# Patient Record
Sex: MALE | Race: WHITE | NOT HISPANIC OR LATINO | Employment: OTHER | URBAN - METROPOLITAN AREA
[De-identification: names, ages, dates, MRNs, and addresses within clinical notes are randomized per-mention and may not be internally consistent; named-entity substitution may affect disease eponyms.]

---

## 2017-01-01 ENCOUNTER — TELEPHONE (OUTPATIENT)
Dept: NEUROLOGY | Facility: CLINIC | Age: 82
End: 2017-01-01

## 2017-01-01 ENCOUNTER — TELEPHONE (OUTPATIENT)
Dept: GASTROENTEROLOGY | Facility: CLINIC | Age: 82
End: 2017-01-01

## 2017-01-01 ENCOUNTER — LAB REQUISITION (OUTPATIENT)
Dept: LAB | Facility: HOSPITAL | Age: 82
End: 2017-01-01

## 2017-01-01 ENCOUNTER — APPOINTMENT (OUTPATIENT)
Dept: GENERAL RADIOLOGY | Facility: HOSPITAL | Age: 82
End: 2017-01-01

## 2017-01-01 ENCOUNTER — APPOINTMENT (OUTPATIENT)
Dept: LAB | Facility: HOSPITAL | Age: 82
End: 2017-01-01

## 2017-01-01 ENCOUNTER — OFFICE VISIT (OUTPATIENT)
Dept: NEUROLOGY | Facility: CLINIC | Age: 82
End: 2017-01-01

## 2017-01-01 ENCOUNTER — HOSPITAL ENCOUNTER (OUTPATIENT)
Dept: CT IMAGING | Facility: HOSPITAL | Age: 82
Discharge: HOME | End: 2017-11-02
Attending: INTERNAL MEDICINE | Admitting: INTERNAL MEDICINE

## 2017-01-01 ENCOUNTER — OFFICE VISIT (OUTPATIENT)
Dept: GASTROENTEROLOGY | Facility: CLINIC | Age: 82
End: 2017-01-01

## 2017-01-01 ENCOUNTER — OUTSIDE FACILITY SERVICE (OUTPATIENT)
Dept: GASTROENTEROLOGY | Facility: CLINIC | Age: 82
End: 2017-01-01

## 2017-01-01 ENCOUNTER — HOSPITAL ENCOUNTER (EMERGENCY)
Facility: HOSPITAL | Age: 82
Discharge: HOME | End: 2017-10-16
Attending: EMERGENCY MEDICINE | Admitting: EMERGENCY MEDICINE

## 2017-01-01 ENCOUNTER — OFFICE VISIT (OUTPATIENT)
Dept: CARDIOLOGY | Facility: CLINIC | Age: 82
End: 2017-01-01

## 2017-01-01 VITALS
SYSTOLIC BLOOD PRESSURE: 122 MMHG | HEART RATE: 56 BPM | HEIGHT: 64 IN | WEIGHT: 149 LBS | DIASTOLIC BLOOD PRESSURE: 66 MMHG | BODY MASS INDEX: 25.44 KG/M2

## 2017-01-01 VITALS
HEIGHT: 64 IN | HEART RATE: 100 BPM | DIASTOLIC BLOOD PRESSURE: 84 MMHG | TEMPERATURE: 97.3 F | BODY MASS INDEX: 25.44 KG/M2 | RESPIRATION RATE: 16 BRPM | WEIGHT: 149 LBS | OXYGEN SATURATION: 95 % | SYSTOLIC BLOOD PRESSURE: 161 MMHG

## 2017-01-01 VITALS
WEIGHT: 150 LBS | BODY MASS INDEX: 25.61 KG/M2 | DIASTOLIC BLOOD PRESSURE: 75 MMHG | SYSTOLIC BLOOD PRESSURE: 112 MMHG | HEIGHT: 64 IN

## 2017-01-01 VITALS
HEIGHT: 65 IN | WEIGHT: 151 LBS | SYSTOLIC BLOOD PRESSURE: 126 MMHG | BODY MASS INDEX: 25.16 KG/M2 | DIASTOLIC BLOOD PRESSURE: 82 MMHG | RESPIRATION RATE: 20 BRPM | TEMPERATURE: 97.7 F | OXYGEN SATURATION: 98 % | HEART RATE: 52 BPM

## 2017-01-01 VITALS
DIASTOLIC BLOOD PRESSURE: 72 MMHG | HEIGHT: 64 IN | TEMPERATURE: 97.2 F | RESPIRATION RATE: 18 BRPM | WEIGHT: 148 LBS | BODY MASS INDEX: 25.27 KG/M2 | OXYGEN SATURATION: 99 % | SYSTOLIC BLOOD PRESSURE: 140 MMHG | HEART RATE: 77 BPM

## 2017-01-01 DIAGNOSIS — K29.51 CHRONIC GASTRITIS WITH BLEEDING: ICD-10-CM

## 2017-01-01 DIAGNOSIS — K29.61 OTHER GASTRITIS WITH BLEEDING: ICD-10-CM

## 2017-01-01 DIAGNOSIS — I10 ESSENTIAL HYPERTENSION: Primary | ICD-10-CM

## 2017-01-01 DIAGNOSIS — F02.80 LEWY BODY DEMENTIA WITHOUT BEHAVIORAL DISTURBANCE (HCC): ICD-10-CM

## 2017-01-01 DIAGNOSIS — I11.9 HYPERTENSIVE HEART DISEASE WITHOUT HEART FAILURE: ICD-10-CM

## 2017-01-01 DIAGNOSIS — F02.80 LEWY BODY DEMENTIA WITHOUT BEHAVIORAL DISTURBANCE (HCC): Primary | ICD-10-CM

## 2017-01-01 DIAGNOSIS — R63.4 WEIGHT LOSS, ABNORMAL: ICD-10-CM

## 2017-01-01 DIAGNOSIS — R15.9 INCONTINENCE OF FECES WITH FECAL URGENCY: ICD-10-CM

## 2017-01-01 DIAGNOSIS — R19.7 DIARRHEA: ICD-10-CM

## 2017-01-01 DIAGNOSIS — R19.7 DIARRHEA, UNSPECIFIED TYPE: Primary | ICD-10-CM

## 2017-01-01 DIAGNOSIS — G31.83 LEWY BODY DEMENTIA WITHOUT BEHAVIORAL DISTURBANCE (HCC): Primary | ICD-10-CM

## 2017-01-01 DIAGNOSIS — S63.501A SPRAIN OF RIGHT WRIST, INITIAL ENCOUNTER: Primary | ICD-10-CM

## 2017-01-01 DIAGNOSIS — R19.7 DIARRHEA, UNSPECIFIED TYPE: ICD-10-CM

## 2017-01-01 DIAGNOSIS — G31.83 LEWY BODY DEMENTIA WITHOUT BEHAVIORAL DISTURBANCE (HCC): ICD-10-CM

## 2017-01-01 DIAGNOSIS — K57.30 PANCOLONIC DIVERTICULOSIS: ICD-10-CM

## 2017-01-01 DIAGNOSIS — R00.2 PALPITATIONS: ICD-10-CM

## 2017-01-01 DIAGNOSIS — R55 SYNCOPE, UNSPECIFIED SYNCOPE TYPE: ICD-10-CM

## 2017-01-01 DIAGNOSIS — K52.9 NONINFECTIVE GASTROENTERITIS AND COLITIS: ICD-10-CM

## 2017-01-01 DIAGNOSIS — K52.9 CHRONIC DIARRHEA: Primary | ICD-10-CM

## 2017-01-01 DIAGNOSIS — R15.2 INCONTINENCE OF FECES WITH FECAL URGENCY: ICD-10-CM

## 2017-01-01 DIAGNOSIS — R58 MULTIPLE ECCHYMOSES OF BOTH UPPER ARMS: ICD-10-CM

## 2017-01-01 LAB
ALBUMIN SERPL-MCNC: 3.9 G/DL (ref 3.2–4.8)
ALBUMIN/GLOB SERPL: 1.3 G/DL (ref 1.5–2.5)
ALP SERPL-CCNC: 80 U/L (ref 25–100)
ALT SERPL W P-5'-P-CCNC: 29 U/L (ref 7–40)
ANION GAP SERPL CALCULATED.3IONS-SCNC: 14 MMOL/L (ref 3–11)
AST SERPL-CCNC: 24 U/L (ref 0–33)
BASOPHILS # BLD AUTO: 0.01 10*3/MM3 (ref 0–0.2)
BASOPHILS NFR BLD AUTO: 0.2 % (ref 0–1)
BILIRUB SERPL-MCNC: 0.8 MG/DL (ref 0.3–1.2)
BUN BLD-MCNC: 28 MG/DL (ref 9–23)
BUN/CREAT SERPL: 18.7 (ref 7–25)
CALCIUM SPEC-SCNC: 9.2 MG/DL (ref 8.7–10.4)
CHLORIDE SERPL-SCNC: 107 MMOL/L (ref 99–109)
CO2 SERPL-SCNC: 20 MMOL/L (ref 20–31)
COLLECT DATE SP2 STL: NORMAL
COLLECT DATE SP3 STL: NORMAL
COLLECT DATE STL: NORMAL
CREAT BLD-MCNC: 1.5 MG/DL (ref 0.6–1.3)
CRP SERPL-MCNC: 3.5 MG/DL (ref 0–1)
CYTO UR: NORMAL
DEPRECATED RDW RBC AUTO: 45.1 FL (ref 37–54)
EOSINOPHIL # BLD AUTO: 0.15 10*3/MM3 (ref 0–0.3)
EOSINOPHIL NFR BLD AUTO: 2.7 % (ref 0–3)
ERYTHROCYTE [DISTWIDTH] IN BLOOD BY AUTOMATED COUNT: 12.4 % (ref 11.3–14.5)
GFR SERPL CREATININE-BSD FRML MDRD: 45 ML/MIN/1.73
GLOBULIN UR ELPH-MCNC: 2.9 GM/DL
GLUCOSE BLD-MCNC: 92 MG/DL (ref 70–100)
HCT VFR BLD AUTO: 43.6 % (ref 38.9–50.9)
HEMOCCULT STL QL: NEGATIVE
HGB BLD-MCNC: 14.7 G/DL (ref 13.1–17.5)
IMM GRANULOCYTES # BLD: 0.01 10*3/MM3 (ref 0–0.03)
IMM GRANULOCYTES NFR BLD: 0.2 % (ref 0–0.6)
INR PPP: 1.01
LAB AP CASE REPORT: NORMAL
LAB AP CLINICAL INFORMATION: NORMAL
LIPASE SERPL-CCNC: 31 U/L (ref 6–51)
LYMPHOCYTES # BLD AUTO: 0.9 10*3/MM3 (ref 0.6–4.8)
LYMPHOCYTES NFR BLD AUTO: 16.5 % (ref 24–44)
Lab: NORMAL
MCH RBC QN AUTO: 33.1 PG (ref 27–31)
MCHC RBC AUTO-ENTMCNC: 33.7 G/DL (ref 32–36)
MCV RBC AUTO: 98.2 FL (ref 80–99)
MONOCYTES # BLD AUTO: 0.49 10*3/MM3 (ref 0–1)
MONOCYTES NFR BLD AUTO: 9 % (ref 0–12)
NEUTROPHILS # BLD AUTO: 3.91 10*3/MM3 (ref 1.5–8.3)
NEUTROPHILS NFR BLD AUTO: 71.4 % (ref 41–71)
PATH REPORT.FINAL DX SPEC: NORMAL
PATH REPORT.GROSS SPEC: NORMAL
PLATELET # BLD AUTO: 109 10*3/MM3 (ref 150–450)
PMV BLD AUTO: 12.3 FL (ref 6–12)
POTASSIUM BLD-SCNC: 4.3 MMOL/L (ref 3.5–5.5)
PROT SERPL-MCNC: 6.8 G/DL (ref 5.7–8.2)
PROTHROMBIN TIME: 11 SECONDS (ref 9.6–11.5)
RBC # BLD AUTO: 4.44 10*6/MM3 (ref 4.2–5.76)
SODIUM BLD-SCNC: 141 MMOL/L (ref 132–146)
VIT B12 BLD-MCNC: 334 PG/ML (ref 211–911)
WBC NRBC COR # BLD: 5.47 10*3/MM3 (ref 3.5–10.8)

## 2017-01-01 PROCEDURE — 36415 COLL VENOUS BLD VENIPUNCTURE: CPT | Performed by: INTERNAL MEDICINE

## 2017-01-01 PROCEDURE — 86140 C-REACTIVE PROTEIN: CPT | Performed by: INTERNAL MEDICINE

## 2017-01-01 PROCEDURE — 99214 OFFICE O/P EST MOD 30 MIN: CPT | Performed by: PSYCHIATRY & NEUROLOGY

## 2017-01-01 PROCEDURE — 80053 COMPREHEN METABOLIC PANEL: CPT | Performed by: INTERNAL MEDICINE

## 2017-01-01 PROCEDURE — 83690 ASSAY OF LIPASE: CPT | Performed by: INTERNAL MEDICINE

## 2017-01-01 PROCEDURE — 74176 CT ABD & PELVIS W/O CONTRAST: CPT

## 2017-01-01 PROCEDURE — 88312 SPECIAL STAINS GROUP 1: CPT | Performed by: INTERNAL MEDICINE

## 2017-01-01 PROCEDURE — 82607 VITAMIN B-12: CPT | Performed by: INTERNAL MEDICINE

## 2017-01-01 PROCEDURE — 43239 EGD BIOPSY SINGLE/MULTIPLE: CPT | Performed by: INTERNAL MEDICINE

## 2017-01-01 PROCEDURE — 85610 PROTHROMBIN TIME: CPT | Performed by: INTERNAL MEDICINE

## 2017-01-01 PROCEDURE — 73110 X-RAY EXAM OF WRIST: CPT

## 2017-01-01 PROCEDURE — 99214 OFFICE O/P EST MOD 30 MIN: CPT | Performed by: NURSE PRACTITIONER

## 2017-01-01 PROCEDURE — 85025 COMPLETE CBC W/AUTO DIFF WBC: CPT | Performed by: INTERNAL MEDICINE

## 2017-01-01 PROCEDURE — 99204 OFFICE O/P NEW MOD 45 MIN: CPT | Performed by: INTERNAL MEDICINE

## 2017-01-01 PROCEDURE — 99283 EMERGENCY DEPT VISIT LOW MDM: CPT

## 2017-01-01 PROCEDURE — 93291 INTERROG DEV EVAL SCRMS IP: CPT | Performed by: INTERNAL MEDICINE

## 2017-01-01 PROCEDURE — 88305 TISSUE EXAM BY PATHOLOGIST: CPT | Performed by: INTERNAL MEDICINE

## 2017-01-01 PROCEDURE — 99213 OFFICE O/P EST LOW 20 MIN: CPT | Performed by: INTERNAL MEDICINE

## 2017-01-01 PROCEDURE — 82272 OCCULT BLD FECES 1-3 TESTS: CPT | Performed by: NURSE PRACTITIONER

## 2017-01-01 PROCEDURE — 0 DIATRIZOATE MEGLUMINE & SODIUM PER 1 ML: Performed by: INTERNAL MEDICINE

## 2017-01-01 RX ORDER — MEMANTINE HYDROCHLORIDE 28 MG/1
CAPSULE, EXTENDED RELEASE ORAL
Qty: 30 CAPSULE | Refills: 1 | Status: SHIPPED | OUTPATIENT
Start: 2017-01-01 | End: 2018-01-01 | Stop reason: SDUPTHER

## 2017-01-01 RX ORDER — OCTISALATE, AVOBENZONE, HOMOSALATE, AND OCTOCRYLENE 29.4; 29.4; 49; 25.48 MG/ML; MG/ML; MG/ML; MG/ML
LOTION TOPICAL DAILY
COMMUNITY

## 2017-01-01 RX ORDER — DONEPEZIL HYDROCHLORIDE 10 MG/1
TABLET, FILM COATED ORAL
Qty: 90 TABLET | Refills: 1 | Status: ON HOLD | OUTPATIENT
Start: 2017-01-01 | End: 2018-01-01

## 2017-01-01 RX ORDER — CARVEDILOL 6.25 MG/1
6.25 TABLET ORAL 2 TIMES DAILY
Qty: 180 TABLET | Refills: 3 | Status: ON HOLD | OUTPATIENT
Start: 2017-01-01 | End: 2018-01-01

## 2017-01-01 RX ORDER — LORATADINE 10 MG/1
CAPSULE, LIQUID FILLED ORAL AS NEEDED
COMMUNITY
End: 2018-01-01 | Stop reason: HOSPADM

## 2017-01-01 RX ORDER — AMLODIPINE BESYLATE 2.5 MG/1
TABLET ORAL
Qty: 90 TABLET | Refills: 2 | Status: ON HOLD | OUTPATIENT
Start: 2017-01-01 | End: 2018-01-01

## 2017-01-01 RX ADMIN — Medication 45 ML: at 13:30

## 2017-02-01 ENCOUNTER — OFFICE VISIT (OUTPATIENT)
Dept: CARDIOLOGY | Facility: CLINIC | Age: 82
End: 2017-02-01

## 2017-02-01 VITALS
HEART RATE: 59 BPM | BODY MASS INDEX: 27.66 KG/M2 | DIASTOLIC BLOOD PRESSURE: 72 MMHG | SYSTOLIC BLOOD PRESSURE: 108 MMHG | WEIGHT: 166 LBS | HEIGHT: 65 IN

## 2017-02-01 DIAGNOSIS — I11.9 HYPERTENSIVE HEART DISEASE WITHOUT HEART FAILURE: ICD-10-CM

## 2017-02-01 DIAGNOSIS — N18.30 CHRONIC KIDNEY DISEASE, STAGE 3 (HCC): ICD-10-CM

## 2017-02-01 DIAGNOSIS — R00.2 PALPITATIONS: ICD-10-CM

## 2017-02-01 DIAGNOSIS — F02.80 LEWY BODY DEMENTIA WITHOUT BEHAVIORAL DISTURBANCE (HCC): ICD-10-CM

## 2017-02-01 DIAGNOSIS — G31.83 LEWY BODY DEMENTIA WITHOUT BEHAVIORAL DISTURBANCE (HCC): ICD-10-CM

## 2017-02-01 DIAGNOSIS — I10 ESSENTIAL HYPERTENSION: Primary | ICD-10-CM

## 2017-02-01 DIAGNOSIS — R55 SYNCOPE, UNSPECIFIED SYNCOPE TYPE: ICD-10-CM

## 2017-02-01 PROCEDURE — 99213 OFFICE O/P EST LOW 20 MIN: CPT | Performed by: INTERNAL MEDICINE

## 2017-02-01 RX ORDER — PRAVASTATIN SODIUM 40 MG
40 TABLET ORAL DAILY
Qty: 90 TABLET | Refills: 2 | Status: ON HOLD | OUTPATIENT
Start: 2017-02-01 | End: 2018-01-01

## 2017-02-01 RX ORDER — CARVEDILOL 6.25 MG/1
6.25 TABLET ORAL 2 TIMES DAILY
Qty: 180 TABLET | Refills: 2 | Status: SHIPPED | OUTPATIENT
Start: 2017-02-01 | End: 2017-01-01 | Stop reason: SDUPTHER

## 2017-02-01 RX ORDER — AMLODIPINE BESYLATE 2.5 MG/1
2.5 TABLET ORAL DAILY
Qty: 90 TABLET | Refills: 3 | Status: SHIPPED | OUTPATIENT
Start: 2017-02-01 | End: 2017-01-01 | Stop reason: SDUPTHER
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
## 2017-02-08 RX ORDER — MEMANTINE HYDROCHLORIDE 28 MG/1
CAPSULE, EXTENDED RELEASE ORAL
Qty: 90 CAPSULE | Refills: 2 | Status: SHIPPED | OUTPATIENT
Start: 2017-02-08 | End: 2017-01-01 | Stop reason: SDUPTHER

## 2017-02-08 RX ORDER — DONEPEZIL HYDROCHLORIDE 10 MG/1
TABLET, FILM COATED ORAL
Qty: 90 TABLET | Refills: 2 | Status: SHIPPED | OUTPATIENT
Start: 2017-02-08 | End: 2017-01-01 | Stop reason: SDUPTHER

## 2017-03-01 ENCOUNTER — TELEPHONE (OUTPATIENT)
Dept: NEUROLOGY | Facility: CLINIC | Age: 82
End: 2017-03-01

## 2017-03-01 NOTE — TELEPHONE ENCOUNTER
I called and spoke to Mrs Aguilar and informed her of  instructions, she verbalized understanding and she will call PCP to see rule out any other medical issue, I offered her sooner f/u on Friday but they are not available, she will call back if needed and has an appt. at the end of this month.

## 2017-03-01 NOTE — TELEPHONE ENCOUNTER
----- Message from Mat Trinidad MD sent at 3/1/2017  2:56 PM EST -----  Regarding: RE: new symptoms  Contact: [phone]  Given the acute change, I would first consider checking with PCP for infection or other acute medical issue. Otherwise, I'm happy to add him on to my schedule, possibly Friday at 3 pm. Thanks.  ----- Message -----     From: Jasmina W Antione     Sent: 3/1/2017   1:30 PM       To: Mat Trinidad MD, #  Subject: new symptoms                                     Phone call from Mrs. Aguilar who wanted to let me know that there have been 2 days where he's had acute confusion, forgetting what a bathroom was, forgetting where his clothes are and how to get dressed, didn't understand how to use the toilet. He's also become incontinent of bowels. Dr. Trinidad, do ou have any recommendations or thought or is this disease progression (which we discussed at length). They would agree to a sooner appt with Dr. Trinidad if you agreed. Sanam, can you please call them back with his recommendations and an appt (if needed). Thanks!

## 2017-03-01 NOTE — TELEPHONE ENCOUNTER
Phone call from Mrs. Aguilar who wanted to let us know that there have been 2 days where he's had acute confusion, forgetting what a bathroom was, forgetting where his clothes are and how to get dressed, didn't understand how to use the toilet. He's also become incontinent of bowels, also refusing depends. We discussed disease progression and approaches to some of the issues she's experiencing. I sent a message to Dr. Trinidad to review and provide feedback on disease progression or any necessary medical evaluation. He is still working out 3x per week though staff says he's much slower at exercise and sometimes has to provide him with reminders and redirection back to the task. Mrs. Aguilar knows she needs to be looking into respite care, which we have discussed in past. She agreed to call me to discuss any other care needs.

## 2017-03-03 ENCOUNTER — TRANSCRIBE ORDERS (OUTPATIENT)
Dept: ADMINISTRATIVE | Facility: HOSPITAL | Age: 82
End: 2017-03-03

## 2017-03-03 DIAGNOSIS — R41.0 CONFUSION: Primary | ICD-10-CM

## 2017-03-06 ENCOUNTER — TELEPHONE (OUTPATIENT)
Dept: NEUROLOGY | Facility: CLINIC | Age: 82
End: 2017-03-06

## 2017-03-06 NOTE — TELEPHONE ENCOUNTER
----- Message from Mat Trinidad MD sent at 3/3/2017  4:04 PM EST -----  Regarding: RE: new symptoms  Contact: [phone]  I think that's reasonable. Thanks.  ----- Message -----     From: Jasmina Talbot     Sent: 3/3/2017   3:57 PM       To: Mat Trinidad MD  Subject: RE: new symptoms                                 Dr. Aguilar saw Dr. Ordonez today who said this is not related to an infection but likely hypoglycemia or seizure activity and ordered EEG (March 22) and he's to monitor blood sugar at home. She's going to proceed with EEG but wanted your feedback...Thanks  ----- Message -----     From: Mat Trinidad MD     Sent: 3/1/2017   2:56 PM       To: Patricia Danielle Snellen, [facility], #  Subject: RE: new symptoms                                 Given the acute change, I would first consider checking with PCP for infection or other acute medical issue. Otherwise, I'm happy to add him on to my schedule, possibly Friday at 3 pm. Thanks.  ----- Message -----     From: Jasmina Talbot     Sent: 3/1/2017   1:30 PM       To: Mat Trinidad MD, #  Subject: new symptoms                                     Phone call from Mrs. Aguilar who wanted to let me know that there have been 2 days where he's had acute confusion, forgetting what a bathroom was, forgetting where his clothes are and how to get dressed, didn't understand how to use the toilet. He's also become incontinent of bowels. Dr. Trinidad, do ou have any recommendations or thought or is this disease progression (which we discussed at length). They would agree to a sooner appt with Dr. Trinidad if you agreed. Sanam, can you please call them back with his recommendations and an appt (if needed). Thanks!

## 2017-03-22 ENCOUNTER — HOSPITAL ENCOUNTER (OUTPATIENT)
Dept: NEUROLOGY | Facility: HOSPITAL | Age: 82
Discharge: HOME | End: 2017-03-22
Admitting: PHYSICIAN ASSISTANT

## 2017-03-22 DIAGNOSIS — R41.0 CONFUSION: ICD-10-CM

## 2017-03-22 PROCEDURE — 95816 EEG AWAKE AND DROWSY: CPT

## 2017-03-24 ENCOUNTER — OFFICE VISIT (OUTPATIENT)
Dept: NEUROLOGY | Facility: CLINIC | Age: 82
End: 2017-03-24

## 2017-03-24 VITALS
HEIGHT: 65 IN | BODY MASS INDEX: 26.66 KG/M2 | SYSTOLIC BLOOD PRESSURE: 120 MMHG | WEIGHT: 160 LBS | DIASTOLIC BLOOD PRESSURE: 80 MMHG

## 2017-03-24 DIAGNOSIS — F02.80 LEWY BODY DEMENTIA WITHOUT BEHAVIORAL DISTURBANCE (HCC): Primary | ICD-10-CM

## 2017-03-24 DIAGNOSIS — G31.83 LEWY BODY DEMENTIA WITHOUT BEHAVIORAL DISTURBANCE (HCC): Primary | ICD-10-CM

## 2017-03-24 PROCEDURE — 99214 OFFICE O/P EST MOD 30 MIN: CPT | Performed by: PSYCHIATRY & NEUROLOGY

## 2017-03-24 RX ORDER — TAMSULOSIN HYDROCHLORIDE 0.4 MG/1
1 CAPSULE ORAL NIGHTLY
Status: ON HOLD | COMMUNITY
End: 2018-01-01

## 2017-03-24 NOTE — PROGRESS NOTES
"Hayder Aguilar    Subjective     CC: memory loss    History of Present Illness   Hayder Aguilar returns to clinic today with a history of possible DLB. He has noted symptoms since at least 2014 marked initially by forgetfulness and repetitiveness. This has gradually worsened  over time. Additional symptoms have included impairments in orientation  and executive function. There has been a history of possible visual hallucinations. He has had no associated  symptoms of BPSD.  He is no longer driving . He is currently residing at home with his wife.       Prior evaluation has included screening blood work  and an MRI of the brain which was unremarkable . He is currently taking donepezil and memantine.      Since his last visit on 9/23/16, his wife has noted a few episodes of increased confusion lasting hours. During these episodes he is disoriented, at times not even recognizing his own bathroom. He has been evaluated by Dr. Ordonez, who ruled out infection. An EEG showed only non-specific slowing, without evidence for seizure tendency. Some concern for hypoglycemia has been raised, and his wife has been concerned about volume depletion.      The following portions of the patient's history were reviewed and updated as appropriate: current medications, past family history, past medical history and past social history.    Review of Systems   Constitutional: Negative.    Respiratory: Negative.    Cardiovascular: Negative.    Genitourinary: Negative.        Objective     /80  Ht 65\" (165.1 cm)  Wt 160 lb (72.6 kg)  BMI 26.63 kg/m2     Neurologic Exam     Mental Status   Oriented to person, place, and time.   Registration: recalls 3 of 3 objects. Recall at 5 minutes: recalls 2 of 3 objects.   Attention: normal. Concentration: normal.   Speech: speech is normal   Level of consciousness: alert  Knowledge: good.   Able to name object. Able to read. Able to repeat. Able to write. Normal comprehension.     Cranial Nerves "   Cranial nerves II through XII intact.     Motor Exam   Muscle bulk: normal  Right arm tone: increased  Left arm tone: increased    Strength   Strength 5/5 throughout.     Sensory Exam   Light touch normal.     Gait, Coordination, and Reflexes     Gait  Gait: shuffling (decreased arm swing)      MMSE=24      Assessment/Plan   Hayder was seen today for memory loss.    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 I again reviewed his current status and treatment options. I suspect that his recent episodes of confusion were a manifestation of his underlying DLB. His EEG is relatively reassuring. After discussing potential treatment options, it was elected to continue on  donepezil and memantine unchanged. However, I did discuss the possibility of a trial of Sinemet for his motoric symptoms, which was declined for now. He will then follow up in 6 months , or sooner if needed.       I spent 25 minutes with the patient and family. I spent 65 percent of this time counseling and discussing evaluation, current status, treatment options and management.    As part of this visit I obtained additional history from the family which is incorporated in the HPI.    Mat Trinidad MD
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
## 2017-04-27 NOTE — TELEPHONE ENCOUNTER
----- Message from Mat Trinidad MD sent at 4/26/2017  4:15 PM EDT -----  Regarding: FW: Vitamin E  Contact: [phone]  The vitamin E is not necessary in my opinion. However, if she would like to try, she might try over the counter vitamin E at just 400 IU daily. Thanks.  ----- Message -----     From: FREDY Herrera     Sent: 4/26/2017   3:51 PM       To: Mat Trinidad MD, #  Subject: Vitamin E                                        Dr. Ordonez recommended Vit. E 4000 units for dementia and this is over $50 at pharmacy. Wife would like for you to say if this is needed or not. Please call her at [phone]

## 2017-04-27 NOTE — TELEPHONE ENCOUNTER
Called and spoke to pts wife huey and informed her of  instructions she verbalized understanding and will cb if she has any additional questions.
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
## 2017-06-23 NOTE — TELEPHONE ENCOUNTER
"Per Dr. Trinidad, I told Daisy to take him to ER to get evaluated and she verbalized understanding.     ----- Message from Patricia Danielle Snellen, CMA sent at 6/23/2017  3:47 PM EDT -----  Contact: Marlen Aguilar (Spouse)      ----- Message -----     From: Mat Trinidad MD     Sent: 6/23/2017   3:42 PM       To: Patricia Danielle Snellen, CMA    If he is hallucinating, please offer a trial of Seroquel 25 mg qhs for now (this could be then increased to 25 mg bid if needed). If he is a danger to himself or others, then EMS should be called and he should be taken to a behavioral health facility such as the [facility]. Thanks.  ----- Message -----     From: Patricia Danielle Snellen, CMA     Sent: 6/23/2017   3:35 PM       To: Mat Trinidad MD        ----- Message -----     From: Elsa Forbes     Sent: 6/23/2017   2:05 PM       To: [facility] [place]    FORREST GEE CALLED IN REGARDS TO HER , KENNEDY. SHE SAID HE \"LOST IT\" TODAY. HE IS HALLUCINATING AND ACTING OUT.  SHE REQUESTED TO SPEAK TO EVELIA    PLEASE CALL MARLEN BACK TODAY  [phone]        "

## 2017-07-13 NOTE — PATIENT INSTRUCTIONS
"Start Over The Counter (OTC) probiotic (Culturelle, Alfredito's Colon, Health, Align or any probiotics that contain \"Lactobacillus\" or \"Bifidobacterium\") once daily.    Benefiber 2 tablespoon a day     Diverticulosis  Diverticulosis is the condition that develops when small pouches (diverticula) form in the wall of your colon. Your colon, or large intestine, is where water is absorbed and stool is formed. The pouches form when the inside layer of your colon pushes through weak spots in the outer layers of your colon.  CAUSES   No one knows exactly what causes diverticulosis.  RISK FACTORS  · Being older than 50. Your risk for this condition increases with age. Diverticulosis is rare in people younger than 40 years. By age 80, almost everyone has it.  · Eating a low-fiber diet.  · Being frequently constipated.  · Being overweight.  · Not getting enough exercise.  · Smoking.  · Taking over-the-counter pain medicines, like aspirin and ibuprofen.  SYMPTOMS   Most people with diverticulosis do not have symptoms.  DIAGNOSIS   Because diverticulosis often has no symptoms, health care providers often discover the condition during an exam for other colon problems. In many cases, a health care provider will diagnose diverticulosis while using a flexible scope to examine the colon (colonoscopy).  TREATMENT   If you have never developed an infection related to diverticulosis, you may not need treatment. If you have had an infection before, treatment may include:  · Eating more fruits, vegetables, and grains.  · Taking a fiber supplement.  · Taking a live bacteria supplement (probiotic).  · Taking medicine to relax your colon.  HOME CARE INSTRUCTIONS   · Drink at least 6-8 glasses of water each day to prevent constipation.  · Try not to strain when you have a bowel movement.  · Keep all follow-up appointments.  If you have had an infection before:   · Increase the fiber in your diet as directed by your health care provider or " dietitian.  · Take a dietary fiber supplement if your health care provider approves.  · Only take medicines as directed by your health care provider.  SEEK MEDICAL CARE IF:   · You have abdominal pain.  · You have bloating.  · You have cramps.  · You have not gone to the bathroom in 3 days.  SEEK IMMEDIATE MEDICAL CARE IF:   · Your pain gets worse.  · Your bloating becomes very bad.  · You have a fever or chills, and your symptoms suddenly get worse.  · You begin vomiting.  · You have bowel movements that are bloody or black.  MAKE SURE YOU:  · Understand these instructions.  · Will watch your condition.  · Will get help right away if you are not doing well or get worse.     This information is not intended to replace advice given to you by your health care provider. Make sure you discuss any questions you have with your health care provider.     Document Released: 09/14/2005 Document Revised: 12/23/2014 Document Reviewed: 11/12/2014  [employer] Interactive Patient Education ©2017 [employer]

## 2017-07-13 NOTE — PROGRESS NOTES
"    OUTPATIENT NEW PATIENT NOTE  Patient: KENNEDY AGUILAR : 1935  Date of Service: 2017  Dear Dr.Gregory ESAU Ordonez MD   Thank you for your referral of this patient for evaluation of equal incontinence and diarrhea  CC: Fecal Incontinence and Diarrhea  Assessment/Plan                                             ASSESSMENT & PLANS     Chronic diarrhea  Pancolonic diverticulosis  -     Clostridium Difficile Toxin, PCR  -     Occult Blood X 3, Stool  · Start Over The Counter (OTC) probiotic (Culturelle, Alfredito's Colon, Health, Align or any probiotics that contain \"Lactobacillus\" or \"Bifidobacterium\") once daily.  · Benefiber 2 tablespoon a day     Follow Up: Return in about 3 months (around 10/13/2017) for Recheck w/ dr house.      DISCUSSION: The above plan was delineated in details with patient and wife and all questions and concerns were answered.  Patient is also given contact information.  Patient is to return as scheduled or sooner if new problems arise  Subjective                                                     SUBJECTIVE   History of Present Illness  Mr. Kennedy Aguilar is a 81 y.o. male, patient of Dr. Arambula, who presents to the clinic today for an evaluation of Fecal Incontinence and Diarrhea  Of note, patient has dementia.  History is partially taken from wife.  There is Incongruence in information between wife and pt.   2-3 BMs a day. Explosive diarrhea w/ pasty/loose/mushy \"gooey\"stools. No blood.  This a problem for yrs  Change: explosive and the foul smelling gas.  Lots of bowel urgency   Diarrhea is mostly daytime  Dr Ordonez checked stools for blood, but no records seen in the computer records   Was 156-158 lbs   No frequent abx  Tried OTC Imodium w/ sx improvement.  Pt was also give a prescribed med (unknown name) by Dr Ordonez, but pt only takes one tab  Last colonoscopy in, done on 1/6/10 by Dr. House, showed pandiverticulosis  Pt denies anorexia, nausea, vomiting, dysphagia, odynophagia, " heartburn, reflux, regurgitation, early satiety.  Pt has good appetite.      ROS:Review of Systems   Constitutional: Negative.    HENT: Negative.    Eyes: Negative.    Respiratory: Negative.    Cardiovascular: Negative.    Gastrointestinal: Positive for abdominal pain and diarrhea.        Fecal Incontinence    Endocrine: Negative.    Genitourinary: Negative.    Musculoskeletal: Negative.    Skin: Negative.    Allergic/Immunologic: Negative.    Neurological: Negative.    Hematological: Negative.    Psychiatric/Behavioral: Negative.      PAST MED HX: Pt  has a past medical history of Borderline abnormal carotid duplex; Chronic kidney disease, stage 3; Cognitive dysfunction; Depression (2016); Diverticulosis (2016); Dyslipidemia; Fracture; GERD (gastroesophageal reflux disease); History of chronic kidney disease; Hyperlipidemia; Hypertension; Insomnia (2016); MCI (mild cognitive impairment); Memory loss; Partial retinal detachment; Pyrosis; Shuffling gait; Syncopal episodes (2016); Umbilical hernia (2016); Visual hallucinations; and Vitamin D deficiency (2016).  PAST SURG HX: Pt  has a past surgical history that includes Tonsillectomy; Circumcision, non-; Other surgical history; Hernia repair; and Other surgical history (2014).  FAM HX: family history includes Dementia in his other; Hypertension in his other; Pulmonary embolism in his mother; Stroke in his father and mother.  SOC HX: Pt  reports that he quit smoking about 52 years ago. His smoking use included Pipe. He has never used smokeless tobacco. He reports that he drinks alcohol. He reports that he does not use illicit drugs.  Objective                                                           OBJECTIVE   Allergy: Pt has No Known Allergies.  MEDS: •  alfuzosin (UROXATRAL) 10 MG 24 hr tablet, Take 1 tablet by mouth daily., Disp: , Rfl:   •  amLODIPine (NORVASC) 2.5 MG tablet, Take 1 tablet by mouth Daily., Disp: 90  tablet, Rfl: 3  •  carvedilol (COREG) 6.25 MG tablet, Take 1 tablet by mouth 2 (Two) Times a Day. With morning and evening meal, Disp: 180 tablet, Rfl: 3  •  Cholecalciferol (VITAMIN D3) 2000 UNITS capsule, Take 2 capsules by mouth daily., Disp: , Rfl:   •  citalopram (CeleXA) 10 MG tablet, Take 1 tablet by mouth daily., Disp: , Rfl:   •  donepezil (ARICEPT) 10 MG tablet, TAKE ONE TABLET BY MOUTH DAILY, Disp: 90 tablet, Rfl: 2  •  Multiple Vitamins-Minerals (CENTRAL-ERLINDA PO), Take 1 tablet by mouth daily., Disp: , Rfl:   •  NAMENDA XR 28 MG capsule sustained-release 24 hr extended release capsule, TAKE ONE CAPSULE BY MOUTH DAILY, Disp: 90 capsule, Rfl: 2  •  pravastatin (PRAVACHOL) 40 MG tablet, Take 1 tablet by mouth Daily., Disp: 90 tablet, Rfl: 2  •  tamsulosin (FLOMAX) 0.4 MG capsule 24 hr capsule, Take 1 capsule by mouth Every Night., Disp: , Rfl:   Lab Results   Component Value Date    WBC 4.22 06/30/2014    HGB 13.8 06/30/2014    HCT 40.0 06/30/2014    MCV 95.7 06/30/2014    MCHC 34.5 06/30/2014     (L) 06/30/2014     Lab Results   Component Value Date     06/30/2014    K 4.0 06/30/2014     (H) 06/30/2014    CO2 23 06/30/2014    BUN 20 06/30/2014    CREATININE 1.7 (H) 06/30/2014    GLUCOSE 93 06/30/2014    CALCIUM 9.1 06/30/2014    ANIONGAP 5 06/30/2014     Lab Results   Component Value Date    AST 25 05/22/2014    ALT 19 05/22/2014    ALKPHOS 71 05/22/2014    BILITOT 0.8 05/22/2014    PROTEINTOT 7.4 05/22/2014    ALBUMIN 4.1 05/22/2014     Wt Readings from Last 5 Encounters:   07/13/17 150 lb (68 kg)   03/24/17 160 lb (72.6 kg)   02/01/17 166 lb (75.3 kg)   09/23/16 158 lb (71.7 kg)   06/29/16 162 lb 12.8 oz (73.8 kg)   body mass index is 24.96 kg/(m^2).,Temp: 97.7 °F (36.5 °C),BP: 126/82,Heart Rate: 52   Physical Exam  General Well developed; well nourished; no acute distress.   ENT Oral mucosa pink and moist without thrush or lesions.    Neck Neck supple; trachea midline. No thyromegaly    Resp CTA; no rhonchi, rales, or wheezes.  Respiration effort normal  CV RRR; normal S1, S2; no M/R/G. No lower extremity edema  GI Abd soft, NT, ND, normal active bowel sounds.  No HSM.  No abd hernia  Skin No rash; no lesions; no bruises.  Skin turgor normal  Musc No clubbing; no cyanosis.  Gait steady  Psych Oriented to time, place, and person.  Appropriate affect  Thank you kindly for allowing me to be part of this patient’s care.    Pt care team: Amber WATT & Dejuan [facility]--Gastroenterology  [phone]    CC: Dr.Gregory ESAU Ordonez MD  [address] [place], [place] [id number] FAX:[phone]

## 2017-07-14 NOTE — TELEPHONE ENCOUNTER
pls let pt know that stools test to check for CDiff is not performed d/t pt having formed stools.    If diarrhea recurs w/ watery/loose stools, pls let us know so we can re-order the test.

## 2017-07-14 NOTE — TELEPHONE ENCOUNTER
"----- Message from Terrie Macedo sent at 7/14/2017 11:24 AM EDT -----  Regarding: Cancellation of Order # [id number]  Order number [id number] for the procedure CLOSTRIDIUM DIFFICILE   TOXIN, PCR [[id number]] has been canceled by Terrie Macedo   [[id number]]. This procedure was ordered by you on Jul 14, 2017 for   the patient Hayder Aguilar [[id number]]. The reason for   cancellation was \"Not Indicated - Formed Stool\".  "

## 2017-07-17 NOTE — TELEPHONE ENCOUNTER
Talked to patient's wife. She stated she didn't know that the stool had to be diarrhea or watery. Wife request a new order. I will inform Amber Sosa.

## 2017-07-17 NOTE — TELEPHONE ENCOUNTER
Called patient's wife back at [phone]. Informed her that Amber put in new order for stool test. Wife voiced understanding.

## 2017-07-27 NOTE — TELEPHONE ENCOUNTER
Informed patient's wife that Dr Alston recommend Fibercon tablets (OTC). Wife voiced understanding.

## 2017-07-27 NOTE — TELEPHONE ENCOUNTER
Patient's wife called. She stated that patient has dementia and he will not eat his food or drink with the Benefiber in it. He doesn't drink enough. Is there a pill or capsule that patient can try? I will ask Dr Alston.

## 2017-07-27 NOTE — TELEPHONE ENCOUNTER
----- Message from Mat Alston MD sent at 7/27/2017 10:52 AM EDT -----  Regarding: RE: PATIENT CALL  Contact: [phone]  Nelson,  I would recommend Fibercon.  Thank you,  GMW  ----- Message -----     From: SHAKIRA Samson     Sent: 7/27/2017   9:52 AM       To: Mat Alston MD  Subject: PATIENT CALL                                     Dr Alston,  Patient has dementia. Amber put him on Benefiber. Wife is having trouble getting him to drink; he doesn't drink enough.  Is there a pill form that patient can try? Thanks

## 2017-09-06 NOTE — PROGRESS NOTES
Subjective:     Encounter Date:09/06/2017      Patient ID: Hayder Aguilar is an 81 y.o.  white male, retired radiologist, from [place], [place]..     REFERRING PHYSICIAN/INTERNIST: Mat Ordonez MD, FACP  DERMATOLOGIST:  Dr. Lal (cannot confirm)  ORTHOPEDIC SURGEON: Rachid Oneill MD  NEUROLOGIST: Mat Trinidad MD     Chief Complaint:   Chief Complaint   Patient presents with   • Hypertensive heart disease   • Hypertension   • Palpitations     Problem List:  1. Probable hypertensive cardiovascular disease:  a. Intermittent atypical chest pain/tachypalpitations syndrome with intermittent transient visual disturbances and near syncope with documented orthostatic hypotension and acceptable telemetry with acceptable quantitative SPECT gated Cardiolite GXT in the setting of acceptable cardiac markers and serial EKGs with normal LVEF (0.68) with excellent (115% predicted) exercise capacity, August 2008.  b. Remote prolonged recurrent symptoms--possible PSVT versus atrial fibrillation, resolved, 2009.   c. Abnormal acceptable IV dobutamine stress echocardiogram, June 2014, with residual class I symptoms.   d. Labile blood pressure readings with acceptable 24-hour ambulatory blood pressure monitor, November 2015.  e. Residual class I symptoms.  2. 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   3. Dyslipidemia.   4. Remote herniorrhaphy.   5. Chronic lower tract obstructive symptoms--possible BPH.   6. History of intermittent pyrosis/gastroesophageal reflux/possible hiatal hernia with GERD syndrome.   7. Diverticulosis.   8. Labile hypertension.  9. Remote right femur fracture status post surgery, autumn 2012.  10. Apparent cognitive dysfunction with memory loss and probable Lewy body dementia without behavioral disturbance - data deficit, 2014.   11. Umbilical hernia.  12. Apparent intermittent depression/insomnia.  13. Remote partial retinal detachment.  14. Vitamin D  "deficiency.  15. Additional operations:  a. Circumcision.  b. Tonsillectomy.  16. 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  17. Stage 3 chronic kidney disease.  18. Remote borderline abnormal carotid duplex with mild nonobstructive plaque in both common carotid and carotid bifurcation without focal hemodynamically significant atherosclerotic stenosis, August 2008.     No Known Allergies      Current Outpatient Prescriptions:   •  amLODIPine (NORVASC) 2.5 MG tablet, Take 1 tablet by mouth Daily., Disp: 90 tablet, Rfl: 3  •  carvedilol (COREG) 6.25 MG tablet, Take 1 tablet by mouth 2 (Two) Times a Day. With morning and evening meal, Disp: 180 tablet, Rfl: 3  •  Cholecalciferol (VITAMIN D3) 2000 UNITS capsule, Take 4,000 Units by mouth Daily., Disp: , Rfl:   •  citalopram (CeleXA) 10 MG tablet, Take 1 tablet by mouth daily., Disp: , Rfl:   •  donepezil (ARICEPT) 10 MG tablet, TAKE ONE TABLET BY MOUTH DAILY, Disp: 90 tablet, Rfl: 2  •  Loratadine (CLARITIN) 10 MG capsule, Take  by mouth As Needed., Disp: , Rfl:   •  Multiple Vitamins-Minerals (CENTRAL-ERLINDA PO), Take 1 tablet by mouth daily., Disp: , Rfl:   •  NAMENDA XR 28 MG capsule sustained-release 24 hr extended release capsule, TAKE ONE CAPSULE BY MOUTH DAILY, Disp: 90 capsule, Rfl: 2  •  pravastatin (PRAVACHOL) 40 MG tablet, Take 1 tablet by mouth Daily., Disp: 90 tablet, Rfl: 2  •  Probiotic Product (ALIGN) 4 MG capsule, Take  by mouth Daily., Disp: , Rfl:   •  tamsulosin (FLOMAX) 0.4 MG capsule 24 hr capsule, Take 1 capsule by mouth Every Night., Disp: , Rfl:     History of Present Illness Patient returns for scheduled 7-month followup.  He says that he thinks he is \"about the same as I was a year ago, maybe a little better.\"  He is concerned about a \"wire protruding from my chest,\" but he is told " "that there is no wire there.  The patient insists that he feels a wire protruding after he gets out of the shower, but he cannot feel it now.  He had his loop recorder checked today, and he and his wife are advised that it looks good.  His wife states that he walks up the street and back, and he goes to [employer] 3 times a week.  She notes that he likes to \"dance with the ladies\" at [employer], and they like to dance with him.  The patient's wife says that his blood work has been good.  She points out that he has lost weight, but \"he's eating good.\"  He is eating breakfast now, and he has never been much of a breakfast eater, since they have been .  Patient otherwise denies chest pain, shortness of breath, PND, edema, palpitations, syncope or presyncope at this time.        Review of Systems   Constitution: Positive for weight loss.   Respiratory: Positive for cough and snoring.    Skin: Positive for dry skin and skin cancer.   Gastrointestinal: Positive for diarrhea and flatus.   Genitourinary: Positive for bladder incontinence (at times).   Neurological: Positive for excessive daytime sleepiness and loss of balance.   Psychiatric/Behavioral:        Confusion, decreased concentration, some hallucinations   Allergic/Immunologic: Positive for environmental allergies.      Obtained and otherwise negative except as outlined in problem list and HPI.    Procedures Medtronic Loop Recorder (LNQ) - R wave 0.68 mV, battery voltage nominal, no events.  Nominal interrogation.       Objective:       Vitals:    09/06/17 1310 09/06/17 1313   BP: 122/71 122/66   BP Location: Left arm Left arm   Patient Position: Sitting Standing   Pulse: 62 56   Weight: 149 lb (67.6 kg)    Height: 64\" (162.6 cm)      Body mass index is 25.58 kg/(m^2).   Last weight:  166 lbs.    Physical Exam   Constitutional: He is oriented to person, place, and time. He appears well-developed and well-nourished.   Neck: No JVD present. Carotid " bruit is not present. No thyromegaly present.   Cardiovascular: Regular rhythm, S1 normal, S2 normal and normal heart sounds.  Exam reveals no gallop, no S3 and no friction rub.    No murmur heard.  Pulses:       Dorsalis pedis pulses are 2+ on the right side, and 2+ on the left side.        Posterior tibial pulses are 2+ on the right side, and 2+ on the left side.   Pulmonary/Chest: Effort normal and breath sounds normal. He has no wheezes. He has no rhonchi. He has no rales.   Implantable loop recorder site nominal   Abdominal: Soft. He exhibits no mass. There is no hepatosplenomegaly. There is no tenderness. There is no guarding.   Lymphadenopathy:     He has no cervical adenopathy.   Neurological: He is alert and oriented to person, place, and time.   Skin: Skin is warm, dry and intact. No rash noted.   Vitals reviewed.      Lab Review:   Lab Results   Component Value Date    GLUCOSE 93 06/30/2014    BUN 20 06/30/2014    CREATININE 1.7 (H) 06/30/2014    CO2 23 06/30/2014    CALCIUM 9.1 06/30/2014    ALBUMIN 4.1 05/22/2014    AST 25 05/22/2014    ALT 19 05/22/2014       Lab Results   Component Value Date    WBC 4.22 06/30/2014    HGB 13.8 06/30/2014    HCT 40.0 06/30/2014    MCV 95.7 06/30/2014     (L) 06/30/2014       Lab Results   Component Value Date    HGBA1C 5.2 06/30/2014           Assessment:   Overall continued acceptable course with no interim cardiopulmonary complaints with acceptable functional status. We will defer additional diagnostic or therapeutic intervention from a cardiac perspective at this time.  Normal implantable loop recorder interrogation today.  We have no recent labs to review.       Diagnosis Plan   1. Essential hypertension  Controlled   2. Palpitations  Nominal ILR interrogation   3. Hypertensive heart disease without heart failure  Controlled   4. Syncope, unspecified syncope type  No recurrence   5. Lewy body dementia without behavioral disturbance  Per Neurology           Plan:         1. Patient to continue current medications and close follow up with the above providers.  2. Tentative cardiology follow up in May 2018, or patient may return sooner PRN.       Transcribed by Shelly Andino for Dr. Jorge Triana at 1:21 PM on 09/06/2017    IJorge MD, [facility], personally performed the services described in this documentation as scribed by the above named individual in my presence, and it is both accurate and complete. At 1:54 PM on 09/06/2017

## 2017-10-17 NOTE — ED PROVIDER NOTES
Subjective   Patient is a 81 y.o. male presenting with wrist injury.   History provided by:  Patient  Wrist Injury   Location:  Wrist  Wrist location:  R wrist  Injury: yes    Mechanism of injury: fall    Fall:     Fall occurred:  Tripped    Impact surface:  Hard floor  Pain details:     Quality:  Aching    Radiates to:  Does not radiate    Severity:  Moderate    Onset quality:  Sudden    Timing:  Constant    Progression:  Unchanged  Prior injury to area:  No  Relieved by:  Nothing  Worsened by:  Movement  Ineffective treatments:  None tried  Associated symptoms: swelling (right wrist)    Associated symptoms: no back pain, no decreased range of motion, no fever, no numbness and no tingling        Review of Systems   Constitutional: Negative for chills and fever.   HENT: Negative for congestion, ear pain, nosebleeds, rhinorrhea and sore throat.    Eyes: Negative for pain, discharge and visual disturbance.   Respiratory: Negative for shortness of breath and wheezing.    Cardiovascular: Negative for chest pain, palpitations and leg swelling.   Gastrointestinal: Negative for abdominal pain, blood in stool, diarrhea, nausea and vomiting.   Endocrine: Negative.    Genitourinary: Negative for dysuria, hematuria and urgency.   Musculoskeletal: Positive for arthralgias (right wrist). Negative for back pain.   Skin: Negative for pallor and rash.   Allergic/Immunologic: Negative for immunocompromised state.   Neurological: Negative for dizziness, speech difficulty, weakness and headaches.   Hematological: Negative for adenopathy. Does not bruise/bleed easily.   Psychiatric/Behavioral: Negative.        Past Medical History:   Diagnosis Date   • Borderline abnormal carotid duplex    • Chronic kidney disease, stage 3    • Cognitive dysfunction    • Depression 12/29/2016   • Diverticulosis 12/29/2016   • Dyslipidemia    • Fracture    • GERD (gastroesophageal reflux disease)    • History of chronic kidney disease    •  Hyperlipidemia    • Hypertension    • Insomnia 12/29/2016   • MCI (mild cognitive impairment)    • Memory loss    • Partial retinal detachment    • Pyrosis    • Shuffling gait    • Syncopal episodes 12/29/2016   • Umbilical hernia 12/29/2016   • Visual hallucinations    • Vitamin D deficiency 12/29/2016       No Known Allergies    Past Surgical History:   Procedure Laterality Date   • CIRCUMCISION     • HERNIA REPAIR     • OTHER SURGICAL HISTORY       right femur fracture status post surgery, autumn 2012.   • OTHER SURGICAL HISTORY  06/2014    implantable loop recorder implant   • TONSILLECTOMY         Family History   Problem Relation Age of Onset   • Dementia Other    • Hypertension Other    • Pulmonary embolism Mother    • Stroke Mother    • Stroke Father        Social History     Social History   • Marital status:      Spouse name: N/A   • Number of children: N/A   • Years of education: N/A     Social History Main Topics   • Smoking status: Former Smoker     Types: Pipe     Quit date: 1965   • Smokeless tobacco: Never Used   • Alcohol use Yes      Comment: rarely   • Drug use: No   • Sexual activity: Defer     Other Topics Concern   • None     Social History Narrative           Objective   Physical Exam   Constitutional: He is oriented to person, place, and time. He appears well-developed and well-nourished. No distress.   HENT:   Head: Normocephalic and atraumatic.   Nose: Nose normal.   Mouth/Throat: Oropharynx is clear and moist.   Eyes: EOM are normal. Pupils are equal, round, and reactive to light. No scleral icterus.   Neck: Normal range of motion. Neck supple.   Cardiovascular: Normal rate, regular rhythm and normal heart sounds.    No murmur heard.  Pulmonary/Chest: Effort normal and breath sounds normal. No respiratory distress. He has no wheezes. He has no rales. He exhibits no tenderness.   Abdominal: Soft. Bowel sounds are normal. There is no tenderness.   Musculoskeletal: Normal range of  motion. He exhibits tenderness (tender right dorsoradial wrist;  mild soft tissue swelling.  No deformity). He exhibits no edema.   Neurological: He is alert and oriented to person, place, and time.   Skin: Skin is warm and dry. No rash noted. He is not diaphoretic.   Psychiatric: He has a normal mood and affect.   Nursing note and vitals reviewed.      Procedures         ED Course  ED Course    X-ray of the right wrist shows no evidence of acute fracture.  There are some degenerative changes and mild soft tissue swelling present.  The patient we discharged home with a Velcro wrist splint.  I advised him to use over-the-counter Motrin or Tylenol as directed for pain.              MDM    Final diagnoses:   Sprain of right wrist, initial encounter            SILVINO Hidalgo  10/16/17 2054

## 2017-10-18 PROBLEM — R15.2 INCONTINENCE OF FECES WITH FECAL URGENCY: Status: ACTIVE | Noted: 2017-01-01

## 2017-10-18 PROBLEM — R15.9 INCONTINENCE OF FECES WITH FECAL URGENCY: Status: ACTIVE | Noted: 2017-01-01

## 2017-10-18 PROBLEM — R63.4 WEIGHT LOSS, ABNORMAL: Status: ACTIVE | Noted: 2017-01-01

## 2017-10-18 PROBLEM — R19.7 DIARRHEA: Status: ACTIVE | Noted: 2017-01-01

## 2017-10-18 NOTE — PROGRESS NOTES
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  He has lost approximately 10-15 lbs in past year.  He has great appetite but gets explosive diarrhea often.  Denies undigested meat or vegetables in stool, no signs of fatty globules in toilet.  No abdominal pain, cramping or nausea or vomiting.  He is also dealing with dementia(Lewy body disease?).  Lots of foul smelling flatus.  He has explosive diarrhea with incontinence of liquid approximately weekly and normally has soft stool 2=3 times a day. No blood in stool seen.      Past Medical History:   Diagnosis Date   • Borderline abnormal carotid duplex    • Chronic kidney disease, stage 3    • Cognitive dysfunction    • Depression 12/29/2016   • Diverticulosis 12/29/2016   • Dyslipidemia    • Fracture    • GERD (gastroesophageal reflux disease)    • History of chronic kidney disease    • Hyperlipidemia    • Hypertension    • Insomnia 12/29/2016   • MCI (mild cognitive impairment)    • Memory loss    • Partial retinal detachment    • Pyrosis    • Shuffling gait    • Syncopal episodes 12/29/2016   • Umbilical hernia 12/29/2016   • Visual hallucinations    • Vitamin D deficiency 12/29/2016         Family History   Problem Relation Age of Onset   • Dementia Other    • Hypertension Other    • Pulmonary embolism Mother    • Stroke Mother    • Stroke Father           reports that he quit smoking about 52 years ago. His smoking use included Pipe. He has never used smokeless tobacco. He reports that he drinks alcohol. He reports that he does not use illicit drugs.        Current Outpatient Prescriptions:   •  amLODIPine (NORVASC) 2.5 MG tablet, Take 1 tablet by mouth Daily., Disp: 90 tablet, Rfl: 3  •  carvedilol (COREG) 6.25 MG tablet, Take 1 tablet by mouth 2 (Two) Times a Day.  "With morning and evening meal, Disp: 180 tablet, Rfl: 3  •  Cholecalciferol (VITAMIN D3) 2000 UNITS capsule, Take 4,000 Units by mouth Daily., Disp: , Rfl:   •  citalopram (CeleXA) 10 MG tablet, Take 1 tablet by mouth daily., Disp: , Rfl:   •  donepezil (ARICEPT) 10 MG tablet, TAKE ONE TABLET BY MOUTH DAILY, Disp: 90 tablet, Rfl: 2  •  Loratadine (CLARITIN) 10 MG capsule, Take  by mouth As Needed., Disp: , Rfl:   •  Multiple Vitamins-Minerals (CENTRAL-ERLINDA PO), Take 1 tablet by mouth daily., Disp: , Rfl:   •  NAMENDA XR 28 MG capsule sustained-release 24 hr extended release capsule, TAKE ONE CAPSULE BY MOUTH DAILY, Disp: 90 capsule, Rfl: 2  •  pravastatin (PRAVACHOL) 40 MG tablet, Take 1 tablet by mouth Daily., Disp: 90 tablet, Rfl: 2  •  Probiotic Product (ALIGN) 4 MG capsule, Take  by mouth Daily., Disp: , Rfl:   •  tamsulosin (FLOMAX) 0.4 MG capsule 24 hr capsule, Take 1 capsule by mouth Every Night., Disp: , Rfl:     Allergies:  Review of patient's allergies indicates no known allergies.    ROS:    Review of Systems   Constitution: Positive for weight loss (Stable since 09/06/2017).   HENT: Negative.    Eyes: Negative.    Cardiovascular: Negative.    Respiratory: Negative.    Endocrine: Negative.    Hematologic/Lymphatic: Negative.    Skin: Negative.    Musculoskeletal: Negative.    Gastrointestinal: Positive for bowel incontinence, diarrhea and flatus.   Genitourinary: Negative.    Neurological: Negative.    Psychiatric/Behavioral: Negative.    Allergic/Immunologic: Negative.        Objective     Blood pressure 140/72, pulse 77, temperature 97.2 °F (36.2 °C), temperature source Temporal Artery , resp. rate 18, height 64\" (162.6 cm), weight 148 lb (67.1 kg), SpO2 99 %.    Physical Exam   Constitutional: Pt is oriented to person, place, and time and well-developed, well-nourished, and in no distress.   HENT:   Mouth/Throat: Oropharynx is clear and moist.   Neck: Normal range of motion. Neck supple. "   Cardiovascular: Normal rate, regular rhythm and normal heart sounds.    Pulmonary/Chest: Effort normal and breath sounds normal. No respiratory distress. No  wheezes.   Abdominal: Soft. Bowel sounds are hyperactive. Borborygmy present.  Soft, non-tender, normal bowel sounds; no bruits, organomegaly or masses.  Skin: Skin is warm and dry.   Psychiatric: Mood, memory, affect and judgment normal.     Assessment/Plan  Dr Aguilar sounds like he may have malabsorption syndrome with weight loss, and very good appetite.  He has easy bruising, explosive diarrhea with incontinence also.  Will do some screening labs, but may need CT of abdomen and /or EGD with small bowel biopsy.     Diagnosis:  Hayder was seen today for follow-up.    Diagnoses and all orders for this visit:    Diarrhea, unspecified type  -     CBC & Differential  -     Comprehensive Metabolic Panel  -     C-reactive Protein  -     Lipase  -     Protime-INR  -     Vitamin B12    Incontinence of feces with fecal urgency  -     CBC & Differential  -     Comprehensive Metabolic Panel  -     C-reactive Protein  -     Lipase  -     Protime-INR  -     Vitamin B12    Weight loss, abnormal  -     CBC & Differential  -     Comprehensive Metabolic Panel  -     C-reactive Protein  -     Lipase  -     Protime-INR  -     Vitamin B12    Multiple ecchymoses of both upper arms  -     Protime-INR        Anticipated Surgical Procedure:    The risks, benefits, and alternatives of this procedure have been discussed with the patient or the responsible party- the patient understands and agrees to proceed.

## 2017-11-01 NOTE — PROGRESS NOTES
"Hayder Aguilar    Subjective     CC: memory loss    History of Present Illness   Hayder Aguilar returns to clinic today with a history of possible DLB. He has noted symptoms since at least 2014 marked initially by forgetfulness and repetitiveness. This has gradually worsened  over time. Additional symptoms have included impairments in orientation  and executive function. There has been a history of possible visual hallucinations. He has had no associated  symptoms of BPSD.  He is no longer driving . He is currently residing at home with his wife.       Prior evaluation has included screening blood work  and an MRI of the brain which was unremarkable . He is currently taking donepezil and memantine.      Since his last visit on 3/24/17 he feels unchanged. His wife continues to note widespread cognitive decline as well as increasing dual incontinence. He is currently being evaluated by Dr. Rubi his bowel symptoms. Worsening aggression has also been noted and Geodon recommended by Dr. Ordonez.      The following portions of the patient's history were reviewed and updated as appropriate: current medications, past family history, past medical history and past social history.    Review of Systems   Constitutional: Negative.  Negative for diaphoresis, fatigue and fever.   Respiratory: Negative.  Negative for shortness of breath.    Cardiovascular: Negative.  Negative for chest pain and palpitations.   Gastrointestinal: Negative for abdominal pain, nausea and vomiting.   Genitourinary: Negative.    Musculoskeletal: Negative for back pain and neck pain.   Neurological: Negative for dizziness, light-headedness and headaches.   Psychiatric/Behavioral: Positive for confusion.       Objective     /75  Ht 64\" (162.6 cm)  Wt 150 lb (68 kg)  BMI 25.75 kg/m2     Neurologic Exam     Mental Status   Oriented to person.   Disoriented to place.   Disoriented to time.   Registration: recalls 3 of 3 objects. Recall at 5 minutes: recalls " 2 of 3 objects.   Attention: normal. Concentration: normal.   Speech: speech is normal   Level of consciousness: alert  Knowledge: good.   Able to name object. Able to read. Able to repeat. Able to write. Normal comprehension.     Cranial Nerves   Cranial nerves II through XII intact.     Motor Exam   Muscle bulk: normal  Right arm tone: increased  Left arm tone: increased    Strength   Strength 5/5 throughout.     Sensory Exam   Light touch normal.     Gait, Coordination, and Reflexes     Gait  Gait: shuffling (decreased arm swing)      MMSE=24      Assessment/Plan   Hayder was seen today for memory loss.    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 I again reviewed his current status and treatment options. After discussing potential treatment options, it was elected to continue on  donepezil and memantine unchanged. I did discuss Geodon as recommended by Dr. Ordonez, along with atypical neuroleptics along with risks and black box warnings in general. I also discussed again the possibility of a trial of Sinemet for his motoric symptoms. He has agreed to referral to PT. He will then follow up in 6 months , or sooner if needed.       I spent 25 minutes face to face with the patient and family. I spent 15 minutes of this time counseling and discussing diagnosis, prognosis, evaluation, current status, treatment options and management.    As part of this visit I obtained additional history from the family which is incorporated in the HPI.    Mat Trinidad MD

## 2017-11-02 NOTE — TELEPHONE ENCOUNTER
----- Message from Mat Trinidad MD sent at 11/1/2017  2:57 PM EDT -----  Yes. Sorry about the confusion. Could you verify the dosage and put it in Epic too? Thanks.  ----- Message -----     From: Patricia Danielle Snellen, CMA     Sent: 11/1/2017   2:15 PM       To: Mat Trinidad MD    Do you want pt to start geodon that's prescribed by ? thanks

## 2017-11-07 NOTE — TELEPHONE ENCOUNTER
Daughter, Daisy, left me a voicemail wanting to discuss the prescription Geodon that Dr. Ordonez prescribed saying she has concerns about this medication saying she didn't agree with the symptoms that Mrs. Aguilar reported to Dr. Ordonez's and our office. I spoke with  before calling her back and he said this is a reasonable medication to be on for the symptoms that were reported and to explain we are not in a position to determine who is telling the truth on report of his symptoms. I left her a voicemail asking to call me back to discuss the above information.

## 2017-11-13 NOTE — TELEPHONE ENCOUNTER
I called Mrs. Aguilar with Dr. Trinidad's recommendations and she verbalized understanding that she will call us in a day or so if no improvement. She said she has caregiver 7 days per week up to 9 pm some nights if needed. She said she feels like she cannot sleep at night fearing he will leave the home. She said even having increased caregivers she is experiencing a great deal of stress in his care needs so I offered to discuss senior living communities which she said she will let me know if she decides this is best. We discussed ways to reduce wandering, disease education on understanding the increased confusion and she understands she can call me at any time.

## 2017-11-13 NOTE — TELEPHONE ENCOUNTER
"Mrs. Aguilar left me a voicemail saying his agitation, restlessness and anger is worsening and has worsened since starting a medicine that Dr. Ordonez started him on recently. She said he is \"staring off into space\", pacing and restless unable to redirect, and eating uncontrollably. She said she stopped this medicine yesterday and wanted to know if that was ok. I called her back and LM asking her to call me so I can get name of medicine to relay to Dr. Trinidad.   "

## 2017-11-13 NOTE — TELEPHONE ENCOUNTER
"----- Message from Mat Trinidad MD sent at 11/13/2017  2:45 PM EST -----  Regarding: RE: worsening symptoms  It is reasonable to stop Geodon, and it might have caused akathisia leading to his restless, which should resolve quickly. Next, we could offer a trial of mirtazapine 15 mg qhs or Seroquel 25 mg qhs depending on preference for a milder or stronger medication. Thanks.  ----- Message -----     From: FREDY Herrera     Sent: 11/13/2017   2:19 PM       To: Mat Trinidad MD  Subject: worsening symptoms                               Wife and daughter both called reporting that he is worse after Dr. Ordonez started him on Geodon. Per wife, he's restless and pacing home and won't settle down, irritable and angry. Daughter said he appears \"drugged\" and more confsed, but bot report they don't feel this is a good medicine for him as patient is also saying he feels \"making me crazy\". Geodon was being given bid and they stopped giving it to him last night. They want to know if ok to stop this med and any other recommendations. Thanks    "

## 2017-11-30 NOTE — TELEPHONE ENCOUNTER
Patients wife called would like Dr. Rubi to call her son who is a physician in [place]. I will send message over to Dr. Rubi.    Hayder [facility]  [phone]
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
## 2017-12-27 NOTE — TELEPHONE ENCOUNTER
Wife called wanted to let Dr. Rubi know the following:    Wife stated that she is not seeing improvement with  and states Dr. Aguilar is having 3-5 bm's per day.

## 2017-12-27 NOTE — TELEPHONE ENCOUNTER
Thais,   Patient's wife called and left message for you to call her back. She has some questions to ask you. Thanks

## 2017-12-27 NOTE — TELEPHONE ENCOUNTER
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 She understands how to get help if needed and verbalized understanding to call us if sees any changes in his symptoms. She also understands she can call PCP to rule out UTI. I left her voicemail but she called back as I was typing this note and verbalized understanding of the above information. I gave her name of senior living placement service to get her thinking about memory care communities as well.

## 2018-01-01 ENCOUNTER — OFFICE VISIT (OUTPATIENT)
Dept: NEUROLOGY | Facility: CLINIC | Age: 83
End: 2018-01-01

## 2018-01-01 ENCOUNTER — TELEPHONE (OUTPATIENT)
Dept: NEUROLOGY | Facility: CLINIC | Age: 83
End: 2018-01-01

## 2018-01-01 ENCOUNTER — APPOINTMENT (OUTPATIENT)
Dept: CT IMAGING | Facility: HOSPITAL | Age: 83
End: 2018-01-01

## 2018-01-01 ENCOUNTER — HOSPITAL ENCOUNTER (INPATIENT)
Facility: HOSPITAL | Age: 83
LOS: 4 days | Discharge: HOME HEALTH | End: 2018-02-24
Attending: EMERGENCY MEDICINE | Admitting: INTERNAL MEDICINE

## 2018-01-01 ENCOUNTER — TELEPHONE (OUTPATIENT)
Dept: GASTROENTEROLOGY | Facility: CLINIC | Age: 83
End: 2018-01-01

## 2018-01-01 ENCOUNTER — TRANSCRIBE ORDERS (OUTPATIENT)
Dept: ADMINISTRATIVE | Facility: HOSPITAL | Age: 83
End: 2018-01-01

## 2018-01-01 ENCOUNTER — HOSPITAL ENCOUNTER (EMERGENCY)
Facility: HOSPITAL | Age: 83
Discharge: HOME | End: 2018-02-13
Attending: EMERGENCY MEDICINE | Admitting: EMERGENCY MEDICINE

## 2018-01-01 ENCOUNTER — APPOINTMENT (OUTPATIENT)
Dept: GENERAL RADIOLOGY | Facility: HOSPITAL | Age: 83
End: 2018-01-01

## 2018-01-01 ENCOUNTER — HOSPITAL ENCOUNTER (OUTPATIENT)
Dept: GENERAL RADIOLOGY | Facility: HOSPITAL | Age: 83
Discharge: HOME | End: 2018-02-13
Attending: INTERNAL MEDICINE | Admitting: INTERNAL MEDICINE

## 2018-01-01 ENCOUNTER — HOSPITAL ENCOUNTER (EMERGENCY)
Facility: HOSPITAL | Age: 83
Discharge: HOME | End: 2018-01-24
Attending: EMERGENCY MEDICINE | Admitting: EMERGENCY MEDICINE

## 2018-01-01 VITALS
TEMPERATURE: 98.1 F | BODY MASS INDEX: 25.61 KG/M2 | OXYGEN SATURATION: 97 % | RESPIRATION RATE: 18 BRPM | HEIGHT: 64 IN | SYSTOLIC BLOOD PRESSURE: 157 MMHG | WEIGHT: 150 LBS | HEART RATE: 52 BPM | DIASTOLIC BLOOD PRESSURE: 88 MMHG

## 2018-01-01 VITALS
HEIGHT: 67 IN | TEMPERATURE: 98.4 F | SYSTOLIC BLOOD PRESSURE: 136 MMHG | BODY MASS INDEX: 23.54 KG/M2 | HEART RATE: 50 BPM | OXYGEN SATURATION: 97 % | RESPIRATION RATE: 18 BRPM | WEIGHT: 150 LBS | DIASTOLIC BLOOD PRESSURE: 73 MMHG

## 2018-01-01 VITALS
HEART RATE: 63 BPM | TEMPERATURE: 97.3 F | WEIGHT: 141.8 LBS | RESPIRATION RATE: 20 BRPM | OXYGEN SATURATION: 95 % | DIASTOLIC BLOOD PRESSURE: 61 MMHG | HEIGHT: 63 IN | SYSTOLIC BLOOD PRESSURE: 117 MMHG | BODY MASS INDEX: 25.12 KG/M2

## 2018-01-01 VITALS
WEIGHT: 140 LBS | HEIGHT: 64 IN | SYSTOLIC BLOOD PRESSURE: 126 MMHG | BODY MASS INDEX: 23.9 KG/M2 | DIASTOLIC BLOOD PRESSURE: 82 MMHG

## 2018-01-01 DIAGNOSIS — R15.9 INCONTINENCE OF FECES WITH FECAL URGENCY: ICD-10-CM

## 2018-01-01 DIAGNOSIS — G31.83 LEWY BODY DEMENTIA WITH BEHAVIORAL DISTURBANCE (HCC): ICD-10-CM

## 2018-01-01 DIAGNOSIS — F02.818 LEWY BODY DEMENTIA WITH BEHAVIORAL DISTURBANCE (HCC): ICD-10-CM

## 2018-01-01 DIAGNOSIS — R19.7 DIARRHEA DUE TO MALABSORPTION: Primary | ICD-10-CM

## 2018-01-01 DIAGNOSIS — R19.7 DIARRHEA DUE TO MALABSORPTION: ICD-10-CM

## 2018-01-01 DIAGNOSIS — R45.1 AGITATION: ICD-10-CM

## 2018-01-01 DIAGNOSIS — R53.1 WEAKNESS: Primary | ICD-10-CM

## 2018-01-01 DIAGNOSIS — S32.010A CLOSED COMPRESSION FRACTURE OF FIRST LUMBAR VERTEBRA, INITIAL ENCOUNTER: ICD-10-CM

## 2018-01-01 DIAGNOSIS — S51.811A LACERATION OF RIGHT FOREARM, INITIAL ENCOUNTER: ICD-10-CM

## 2018-01-01 DIAGNOSIS — R46.89 COMBATIVE BEHAVIOR: ICD-10-CM

## 2018-01-01 DIAGNOSIS — K90.9 DIARRHEA DUE TO MALABSORPTION: Primary | ICD-10-CM

## 2018-01-01 DIAGNOSIS — Z74.09 IMPAIRED FUNCTIONAL MOBILITY, BALANCE, GAIT, AND ENDURANCE: ICD-10-CM

## 2018-01-01 DIAGNOSIS — R15.2 INCONTINENCE OF FECES WITH FECAL URGENCY: ICD-10-CM

## 2018-01-01 DIAGNOSIS — R41.0 CONFUSION: Primary | ICD-10-CM

## 2018-01-01 DIAGNOSIS — G31.83 LEWY BODY DEMENTIA WITH BEHAVIORAL DISTURBANCE (HCC): Primary | ICD-10-CM

## 2018-01-01 DIAGNOSIS — F02.818 LEWY BODY DEMENTIA WITH BEHAVIORAL DISTURBANCE (HCC): Primary | ICD-10-CM

## 2018-01-01 DIAGNOSIS — F02.80 LEWY BODY DEMENTIA WITHOUT BEHAVIORAL DISTURBANCE (HCC): Primary | ICD-10-CM

## 2018-01-01 DIAGNOSIS — R05.9 COUGH: Primary | ICD-10-CM

## 2018-01-01 DIAGNOSIS — F03.90 DEMENTIA WITHOUT BEHAVIORAL DISTURBANCE, UNSPECIFIED DEMENTIA TYPE: Primary | ICD-10-CM

## 2018-01-01 DIAGNOSIS — G31.83 LEWY BODY DEMENTIA WITHOUT BEHAVIORAL DISTURBANCE (HCC): Primary | ICD-10-CM

## 2018-01-01 DIAGNOSIS — K90.9 DIARRHEA DUE TO MALABSORPTION: ICD-10-CM

## 2018-01-01 LAB
ALBUMIN SERPL-MCNC: 3.5 G/DL (ref 3.2–4.8)
ALBUMIN SERPL-MCNC: 3.6 G/DL (ref 3.2–4.8)
ALBUMIN SERPL-MCNC: 3.6 G/DL (ref 3.2–4.8)
ALBUMIN/GLOB SERPL: 1.3 G/DL (ref 1.5–2.5)
ALBUMIN/GLOB SERPL: 1.3 G/DL (ref 1.5–2.5)
ALBUMIN/GLOB SERPL: 1.5 G/DL (ref 1.5–2.5)
ALP SERPL-CCNC: 101 U/L (ref 25–100)
ALP SERPL-CCNC: 106 U/L (ref 25–100)
ALP SERPL-CCNC: 71 U/L (ref 25–100)
ALT SERPL W P-5'-P-CCNC: 23 U/L (ref 7–40)
ALT SERPL W P-5'-P-CCNC: 31 U/L (ref 7–40)
ALT SERPL W P-5'-P-CCNC: 36 U/L (ref 7–40)
ANION GAP SERPL CALCULATED.3IONS-SCNC: 3 MMOL/L (ref 3–11)
ANION GAP SERPL CALCULATED.3IONS-SCNC: 4 MMOL/L (ref 3–11)
ANION GAP SERPL CALCULATED.3IONS-SCNC: 5 MMOL/L (ref 3–11)
ANION GAP SERPL CALCULATED.3IONS-SCNC: 7 MMOL/L (ref 3–11)
AST SERPL-CCNC: 26 U/L (ref 0–33)
AST SERPL-CCNC: 30 U/L (ref 0–33)
AST SERPL-CCNC: 32 U/L (ref 0–33)
BASOPHILS # BLD AUTO: 0.01 10*3/MM3 (ref 0–0.2)
BASOPHILS NFR BLD AUTO: 0.2 % (ref 0–1)
BILIRUB SERPL-MCNC: 0.5 MG/DL (ref 0.3–1.2)
BILIRUB UR QL STRIP: NEGATIVE
BILIRUB UR QL STRIP: NEGATIVE
BUN BLD-MCNC: 26 MG/DL (ref 9–23)
BUN BLD-MCNC: 28 MG/DL (ref 9–23)
BUN BLD-MCNC: 29 MG/DL (ref 9–23)
BUN BLD-MCNC: 32 MG/DL (ref 9–23)
BUN/CREAT SERPL: 16.3 (ref 7–25)
BUN/CREAT SERPL: 16.8 (ref 7–25)
BUN/CREAT SERPL: 18.1 (ref 7–25)
BUN/CREAT SERPL: 18.7 (ref 7–25)
CALCIUM SPEC-SCNC: 8.8 MG/DL (ref 8.7–10.4)
CALCIUM SPEC-SCNC: 8.9 MG/DL (ref 8.7–10.4)
CALCIUM SPEC-SCNC: 8.9 MG/DL (ref 8.7–10.4)
CALCIUM SPEC-SCNC: 9.1 MG/DL (ref 8.7–10.4)
CHLORIDE SERPL-SCNC: 111 MMOL/L (ref 99–109)
CHLORIDE SERPL-SCNC: 111 MMOL/L (ref 99–109)
CHLORIDE SERPL-SCNC: 112 MMOL/L (ref 99–109)
CHLORIDE SERPL-SCNC: 113 MMOL/L (ref 99–109)
CLARITY UR: CLEAR
CLARITY UR: CLEAR
CO2 SERPL-SCNC: 23 MMOL/L (ref 20–31)
CO2 SERPL-SCNC: 26 MMOL/L (ref 20–31)
CO2 SERPL-SCNC: 27 MMOL/L (ref 20–31)
CO2 SERPL-SCNC: 28 MMOL/L (ref 20–31)
COLOR UR: YELLOW
COLOR UR: YELLOW
CREAT BLD-MCNC: 1.5 MG/DL (ref 0.6–1.3)
CREAT BLD-MCNC: 1.6 MG/DL (ref 0.6–1.3)
CREAT BLD-MCNC: 1.6 MG/DL (ref 0.6–1.3)
CREAT BLD-MCNC: 1.9 MG/DL (ref 0.6–1.3)
DEPRECATED RDW RBC AUTO: 45.6 FL (ref 37–54)
DEPRECATED RDW RBC AUTO: 45.9 FL (ref 37–54)
DEPRECATED RDW RBC AUTO: 46 FL (ref 37–54)
EOSINOPHIL # BLD AUTO: 0.13 10*3/MM3 (ref 0–0.3)
EOSINOPHIL # BLD AUTO: 0.23 10*3/MM3 (ref 0–0.3)
EOSINOPHIL # BLD AUTO: 0.23 10*3/MM3 (ref 0–0.3)
EOSINOPHIL NFR BLD AUTO: 3 % (ref 0–3)
EOSINOPHIL NFR BLD AUTO: 4.6 % (ref 0–3)
EOSINOPHIL NFR BLD AUTO: 4.6 % (ref 0–3)
ERYTHROCYTE [DISTWIDTH] IN BLOOD BY AUTOMATED COUNT: 12.8 % (ref 11.3–14.5)
ERYTHROCYTE [DISTWIDTH] IN BLOOD BY AUTOMATED COUNT: 12.9 % (ref 11.3–14.5)
ERYTHROCYTE [DISTWIDTH] IN BLOOD BY AUTOMATED COUNT: 12.9 % (ref 11.3–14.5)
GFR SERPL CREATININE-BSD FRML MDRD: 34 ML/MIN/1.73
GFR SERPL CREATININE-BSD FRML MDRD: 42 ML/MIN/1.73
GFR SERPL CREATININE-BSD FRML MDRD: 42 ML/MIN/1.73
GFR SERPL CREATININE-BSD FRML MDRD: 45 ML/MIN/1.73
GLOBULIN UR ELPH-MCNC: 2.4 GM/DL
GLOBULIN UR ELPH-MCNC: 2.8 GM/DL
GLOBULIN UR ELPH-MCNC: 2.8 GM/DL
GLUCOSE BLD-MCNC: 103 MG/DL (ref 70–100)
GLUCOSE BLD-MCNC: 85 MG/DL (ref 70–100)
GLUCOSE BLD-MCNC: 93 MG/DL (ref 70–100)
GLUCOSE BLD-MCNC: 95 MG/DL (ref 70–100)
GLUCOSE BLDC GLUCOMTR-MCNC: 91 MG/DL (ref 70–130)
GLUCOSE UR STRIP-MCNC: NEGATIVE MG/DL
GLUCOSE UR STRIP-MCNC: NEGATIVE MG/DL
HCT VFR BLD AUTO: 38.2 % (ref 38.9–50.9)
HCT VFR BLD AUTO: 38.2 % (ref 38.9–50.9)
HCT VFR BLD AUTO: 39.3 % (ref 38.9–50.9)
HGB BLD-MCNC: 12.5 G/DL (ref 13.1–17.5)
HGB BLD-MCNC: 12.8 G/DL (ref 13.1–17.5)
HGB BLD-MCNC: 13.2 G/DL (ref 13.1–17.5)
HGB UR QL STRIP.AUTO: NEGATIVE
HGB UR QL STRIP.AUTO: NEGATIVE
HOLD SPECIMEN: NORMAL
HOLD SPECIMEN: NORMAL
IMM GRANULOCYTES # BLD: 0.01 10*3/MM3 (ref 0–0.03)
IMM GRANULOCYTES NFR BLD: 0.2 % (ref 0–0.6)
KETONES UR QL STRIP: NEGATIVE
KETONES UR QL STRIP: NEGATIVE
LEUKOCYTE ESTERASE UR QL STRIP.AUTO: NEGATIVE
LEUKOCYTE ESTERASE UR QL STRIP.AUTO: NEGATIVE
LYMPHOCYTES # BLD AUTO: 1.05 10*3/MM3 (ref 0.6–4.8)
LYMPHOCYTES # BLD AUTO: 1.08 10*3/MM3 (ref 0.6–4.8)
LYMPHOCYTES # BLD AUTO: 1.11 10*3/MM3 (ref 0.6–4.8)
LYMPHOCYTES NFR BLD AUTO: 20.9 % (ref 24–44)
LYMPHOCYTES NFR BLD AUTO: 21.7 % (ref 24–44)
LYMPHOCYTES NFR BLD AUTO: 25.8 % (ref 24–44)
MAGNESIUM SERPL-MCNC: 2 MG/DL (ref 1.3–2.7)
MCH RBC QN AUTO: 32.1 PG (ref 27–31)
MCH RBC QN AUTO: 32.6 PG (ref 27–31)
MCH RBC QN AUTO: 32.6 PG (ref 27–31)
MCHC RBC AUTO-ENTMCNC: 32.7 G/DL (ref 32–36)
MCHC RBC AUTO-ENTMCNC: 33.5 G/DL (ref 32–36)
MCHC RBC AUTO-ENTMCNC: 33.6 G/DL (ref 32–36)
MCV RBC AUTO: 97 FL (ref 80–99)
MCV RBC AUTO: 97.2 FL (ref 80–99)
MCV RBC AUTO: 98.2 FL (ref 80–99)
MONOCYTES # BLD AUTO: 0.28 10*3/MM3 (ref 0–1)
MONOCYTES # BLD AUTO: 0.41 10*3/MM3 (ref 0–1)
MONOCYTES # BLD AUTO: 0.44 10*3/MM3 (ref 0–1)
MONOCYTES NFR BLD AUTO: 6.5 % (ref 0–12)
MONOCYTES NFR BLD AUTO: 8.2 % (ref 0–12)
MONOCYTES NFR BLD AUTO: 8.7 % (ref 0–12)
NEUTROPHILS # BLD AUTO: 2.77 10*3/MM3 (ref 1.5–8.3)
NEUTROPHILS # BLD AUTO: 3.24 10*3/MM3 (ref 1.5–8.3)
NEUTROPHILS # BLD AUTO: 3.29 10*3/MM3 (ref 1.5–8.3)
NEUTROPHILS NFR BLD AUTO: 64.3 % (ref 41–71)
NEUTROPHILS NFR BLD AUTO: 65.1 % (ref 41–71)
NEUTROPHILS NFR BLD AUTO: 65.4 % (ref 41–71)
NITRITE UR QL STRIP: NEGATIVE
NITRITE UR QL STRIP: NEGATIVE
PH UR STRIP.AUTO: 6 [PH] (ref 5–8)
PH UR STRIP.AUTO: 7 [PH] (ref 5–8)
PLATELET # BLD AUTO: 102 10*3/MM3 (ref 150–450)
PLATELET # BLD AUTO: 103 10*3/MM3 (ref 150–450)
PLATELET # BLD AUTO: 99 10*3/MM3 (ref 150–450)
PMV BLD AUTO: 11.6 FL (ref 6–12)
PMV BLD AUTO: 11.8 FL (ref 6–12)
PMV BLD AUTO: 11.8 FL (ref 6–12)
POTASSIUM BLD-SCNC: 3.8 MMOL/L (ref 3.5–5.5)
POTASSIUM BLD-SCNC: 3.9 MMOL/L (ref 3.5–5.5)
POTASSIUM BLD-SCNC: 4 MMOL/L (ref 3.5–5.5)
POTASSIUM BLD-SCNC: 4.1 MMOL/L (ref 3.5–5.5)
PROT SERPL-MCNC: 6 G/DL (ref 5.7–8.2)
PROT SERPL-MCNC: 6.3 G/DL (ref 5.7–8.2)
PROT SERPL-MCNC: 6.4 G/DL (ref 5.7–8.2)
PROT UR QL STRIP: NEGATIVE
PROT UR QL STRIP: NEGATIVE
RBC # BLD AUTO: 3.89 10*6/MM3 (ref 4.2–5.76)
RBC # BLD AUTO: 3.93 10*6/MM3 (ref 4.2–5.76)
RBC # BLD AUTO: 4.05 10*6/MM3 (ref 4.2–5.76)
SODIUM BLD-SCNC: 141 MMOL/L (ref 132–146)
SODIUM BLD-SCNC: 142 MMOL/L (ref 132–146)
SODIUM BLD-SCNC: 143 MMOL/L (ref 132–146)
SODIUM BLD-SCNC: 144 MMOL/L (ref 132–146)
SP GR UR STRIP: 1.01 (ref 1–1.03)
SP GR UR STRIP: 1.02 (ref 1–1.03)
TROPONIN I SERPL-MCNC: 0 NG/ML (ref 0–0.07)
UROBILINOGEN UR QL STRIP: NORMAL
UROBILINOGEN UR QL STRIP: NORMAL
WBC NRBC COR # BLD: 4.31 10*3/MM3 (ref 3.5–10.8)
WBC NRBC COR # BLD: 4.98 10*3/MM3 (ref 3.5–10.8)
WBC NRBC COR # BLD: 5.03 10*3/MM3 (ref 3.5–10.8)
WHOLE BLOOD HOLD SPECIMEN: NORMAL
WHOLE BLOOD HOLD SPECIMEN: NORMAL

## 2018-01-01 PROCEDURE — 25010000002 HALOPERIDOL LACTATE PER 5 MG: Performed by: INTERNAL MEDICINE

## 2018-01-01 PROCEDURE — G0378 HOSPITAL OBSERVATION PER HR: HCPCS

## 2018-01-01 PROCEDURE — 25010000002 HEPARIN (PORCINE) PER 1000 UNITS: Performed by: NURSE PRACTITIONER

## 2018-01-01 PROCEDURE — 70450 CT HEAD/BRAIN W/O DYE: CPT

## 2018-01-01 PROCEDURE — 80053 COMPREHEN METABOLIC PANEL: CPT | Performed by: EMERGENCY MEDICINE

## 2018-01-01 PROCEDURE — 25010000002 HYDRALAZINE PER 20 MG: Performed by: INTERNAL MEDICINE

## 2018-01-01 PROCEDURE — 99232 SBSQ HOSP IP/OBS MODERATE 35: CPT | Performed by: PSYCHIATRY & NEUROLOGY

## 2018-01-01 PROCEDURE — 99285 EMERGENCY DEPT VISIT HI MDM: CPT

## 2018-01-01 PROCEDURE — 25010000002 LORAZEPAM PER 2 MG: Performed by: EMERGENCY MEDICINE

## 2018-01-01 PROCEDURE — 99219 PR INITIAL OBSERVATION CARE/DAY 50 MINUTES: CPT | Performed by: INTERNAL MEDICINE

## 2018-01-01 PROCEDURE — 97116 GAIT TRAINING THERAPY: CPT

## 2018-01-01 PROCEDURE — 85025 COMPLETE CBC W/AUTO DIFF WBC: CPT | Performed by: EMERGENCY MEDICINE

## 2018-01-01 PROCEDURE — 80048 BASIC METABOLIC PNL TOTAL CA: CPT | Performed by: INTERNAL MEDICINE

## 2018-01-01 PROCEDURE — 81003 URINALYSIS AUTO W/O SCOPE: CPT | Performed by: EMERGENCY MEDICINE

## 2018-01-01 PROCEDURE — 99225 PR SBSQ OBSERVATION CARE/DAY 25 MINUTES: CPT | Performed by: INTERNAL MEDICINE

## 2018-01-01 PROCEDURE — 82962 GLUCOSE BLOOD TEST: CPT

## 2018-01-01 PROCEDURE — 99214 OFFICE O/P EST MOD 30 MIN: CPT | Performed by: PSYCHIATRY & NEUROLOGY

## 2018-01-01 PROCEDURE — 71045 X-RAY EXAM CHEST 1 VIEW: CPT

## 2018-01-01 PROCEDURE — 72131 CT LUMBAR SPINE W/O DYE: CPT

## 2018-01-01 PROCEDURE — 99213 OFFICE O/P EST LOW 20 MIN: CPT | Performed by: PSYCHIATRY & NEUROLOGY

## 2018-01-01 PROCEDURE — 71046 X-RAY EXAM CHEST 2 VIEWS: CPT

## 2018-01-01 PROCEDURE — 25010000002 HALOPERIDOL LACTATE PER 5 MG: Performed by: PSYCHIATRY & NEUROLOGY

## 2018-01-01 PROCEDURE — 97110 THERAPEUTIC EXERCISES: CPT

## 2018-01-01 PROCEDURE — 99238 HOSP IP/OBS DSCHRG MGMT 30/<: CPT | Performed by: NURSE PRACTITIONER

## 2018-01-01 PROCEDURE — 96374 THER/PROPH/DIAG INJ IV PUSH: CPT

## 2018-01-01 PROCEDURE — 99231 SBSQ HOSP IP/OBS SF/LOW 25: CPT | Performed by: PHYSICIAN ASSISTANT

## 2018-01-01 PROCEDURE — 99226 PR SBSQ OBSERVATION CARE/DAY 35 MINUTES: CPT | Performed by: INTERNAL MEDICINE

## 2018-01-01 PROCEDURE — 25010000002 HALOPERIDOL LACTATE PER 5 MG: Performed by: NURSE PRACTITIONER

## 2018-01-01 PROCEDURE — 99284 EMERGENCY DEPT VISIT MOD MDM: CPT

## 2018-01-01 PROCEDURE — 93005 ELECTROCARDIOGRAM TRACING: CPT | Performed by: EMERGENCY MEDICINE

## 2018-01-01 PROCEDURE — 73090 X-RAY EXAM OF FOREARM: CPT

## 2018-01-01 PROCEDURE — 99231 SBSQ HOSP IP/OBS SF/LOW 25: CPT | Performed by: PSYCHIATRY & NEUROLOGY

## 2018-01-01 PROCEDURE — 97162 PT EVAL MOD COMPLEX 30 MIN: CPT

## 2018-01-01 PROCEDURE — G8978 MOBILITY CURRENT STATUS: HCPCS

## 2018-01-01 PROCEDURE — 84484 ASSAY OF TROPONIN QUANT: CPT

## 2018-01-01 PROCEDURE — G8979 MOBILITY GOAL STATUS: HCPCS

## 2018-01-01 PROCEDURE — 83735 ASSAY OF MAGNESIUM: CPT | Performed by: EMERGENCY MEDICINE

## 2018-01-01 PROCEDURE — 72128 CT CHEST SPINE W/O DYE: CPT

## 2018-01-01 PROCEDURE — 0HQDXZZ REPAIR RIGHT LOWER ARM SKIN, EXTERNAL APPROACH: ICD-10-PCS | Performed by: EMERGENCY MEDICINE

## 2018-01-01 RX ORDER — PANTOPRAZOLE SODIUM 40 MG/1
40 TABLET, DELAYED RELEASE ORAL EVERY MORNING
Status: DISCONTINUED | OUTPATIENT
Start: 2018-01-01 | End: 2018-01-01 | Stop reason: HOSPADM

## 2018-01-01 RX ORDER — SODIUM CHLORIDE 0.9 % (FLUSH) 0.9 %
1-10 SYRINGE (ML) INJECTION AS NEEDED
Status: DISCONTINUED | OUTPATIENT
Start: 2018-01-01 | End: 2018-01-01 | Stop reason: HOSPADM

## 2018-01-01 RX ORDER — HYDRALAZINE HYDROCHLORIDE 20 MG/ML
10 INJECTION INTRAMUSCULAR; INTRAVENOUS EVERY 4 HOURS PRN
Status: DISCONTINUED | OUTPATIENT
Start: 2018-01-01 | End: 2018-01-01 | Stop reason: HOSPADM

## 2018-01-01 RX ORDER — CARVEDILOL 6.25 MG/1
6.25 TABLET ORAL ONCE
Status: COMPLETED | OUTPATIENT
Start: 2018-01-01 | End: 2018-01-01

## 2018-01-01 RX ORDER — MIRTAZAPINE 15 MG/1
15 TABLET, FILM COATED ORAL NIGHTLY
Status: DISCONTINUED | OUTPATIENT
Start: 2018-01-01 | End: 2018-01-01

## 2018-01-01 RX ORDER — QUETIAPINE FUMARATE 100 MG/1
100 TABLET, FILM COATED ORAL NIGHTLY
Qty: 30 TABLET | Refills: 0 | Status: SHIPPED | OUTPATIENT
Start: 2018-01-01 | End: 2018-01-01 | Stop reason: HOSPADM

## 2018-01-01 RX ORDER — HALOPERIDOL 5 MG/ML
2 INJECTION INTRAMUSCULAR EVERY 6 HOURS PRN
Status: DISCONTINUED | OUTPATIENT
Start: 2018-01-01 | End: 2018-01-01

## 2018-01-01 RX ORDER — HYDROXYZINE HYDROCHLORIDE 25 MG/1
25 TABLET, FILM COATED ORAL 3 TIMES DAILY PRN
Qty: 16 TABLET | Refills: 0 | Status: SHIPPED | OUTPATIENT
Start: 2018-01-01 | End: 2018-01-01

## 2018-01-01 RX ORDER — LORAZEPAM 1 MG/1
1 TABLET ORAL ONCE
Status: COMPLETED | OUTPATIENT
Start: 2018-01-01 | End: 2018-01-01

## 2018-01-01 RX ORDER — LIDOCAINE HYDROCHLORIDE 10 MG/ML
5 INJECTION, SOLUTION EPIDURAL; INFILTRATION; INTRACAUDAL; PERINEURAL ONCE
Status: COMPLETED | OUTPATIENT
Start: 2018-01-01 | End: 2018-01-01

## 2018-01-01 RX ORDER — HALOPERIDOL 5 MG/ML
0.5 INJECTION INTRAMUSCULAR EVERY 6 HOURS PRN
Status: DISCONTINUED | OUTPATIENT
Start: 2018-01-01 | End: 2018-01-01

## 2018-01-01 RX ORDER — MEMANTINE HYDROCHLORIDE 10 MG/1
10 TABLET ORAL EVERY 12 HOURS SCHEDULED
Status: DISCONTINUED | OUTPATIENT
Start: 2018-01-01 | End: 2018-01-01 | Stop reason: HOSPADM

## 2018-01-01 RX ORDER — LORAZEPAM 2 MG/ML
1.5 INJECTION INTRAMUSCULAR ONCE
Status: COMPLETED | OUTPATIENT
Start: 2018-01-01 | End: 2018-01-01

## 2018-01-01 RX ORDER — ONDANSETRON 4 MG/1
4 TABLET, FILM COATED ORAL EVERY 6 HOURS PRN
Status: DISCONTINUED | OUTPATIENT
Start: 2018-01-01 | End: 2018-01-01 | Stop reason: HOSPADM

## 2018-01-01 RX ORDER — CARVEDILOL 6.25 MG/1
6.25 TABLET ORAL 2 TIMES DAILY WITH MEALS
Status: DISCONTINUED | OUTPATIENT
Start: 2018-01-01 | End: 2018-01-01 | Stop reason: HOSPADM

## 2018-01-01 RX ORDER — MIRTAZAPINE 15 MG/1
15 TABLET, FILM COATED ORAL NIGHTLY
Qty: 30 TABLET | Refills: 6 | Status: SHIPPED | OUTPATIENT
Start: 2018-01-01 | End: 2018-01-01 | Stop reason: HOSPADM

## 2018-01-01 RX ORDER — MEMANTINE HYDROCHLORIDE 28 MG/1
CAPSULE, EXTENDED RELEASE ORAL
Qty: 30 CAPSULE | Refills: 0 | Status: SHIPPED | OUTPATIENT
Start: 2018-01-01

## 2018-01-01 RX ORDER — BACITRACIN, NEOMYCIN, POLYMYXIN B 400; 3.5; 5 [USP'U]/G; MG/G; [USP'U]/G
1 OINTMENT TOPICAL ONCE
Status: COMPLETED | OUTPATIENT
Start: 2018-01-01 | End: 2018-01-01

## 2018-01-01 RX ORDER — CARVEDILOL 6.25 MG/1
6.25 TABLET ORAL 2 TIMES DAILY WITH MEALS
Qty: 180 TABLET | Refills: 3
Start: 2018-01-01

## 2018-01-01 RX ORDER — QUETIAPINE FUMARATE 100 MG/1
100 TABLET, FILM COATED ORAL 3 TIMES DAILY
Qty: 90 TABLET | Refills: 1 | Status: SHIPPED | OUTPATIENT
Start: 2018-01-01

## 2018-01-01 RX ORDER — ONDANSETRON 2 MG/ML
4 INJECTION INTRAMUSCULAR; INTRAVENOUS EVERY 6 HOURS PRN
Status: DISCONTINUED | OUTPATIENT
Start: 2018-01-01 | End: 2018-01-01 | Stop reason: HOSPADM

## 2018-01-01 RX ORDER — HEPARIN SODIUM 5000 [USP'U]/ML
5000 INJECTION, SOLUTION INTRAVENOUS; SUBCUTANEOUS EVERY 12 HOURS SCHEDULED
Status: DISCONTINUED | OUTPATIENT
Start: 2018-01-01 | End: 2018-01-01 | Stop reason: HOSPADM

## 2018-01-01 RX ORDER — HALOPERIDOL 5 MG/ML
1 INJECTION INTRAMUSCULAR EVERY 6 HOURS PRN
Status: DISCONTINUED | OUTPATIENT
Start: 2018-01-01 | End: 2018-01-01

## 2018-01-01 RX ORDER — HYDROCODONE BITARTRATE AND ACETAMINOPHEN 5; 325 MG/1; MG/1
1 TABLET ORAL ONCE AS NEEDED
Status: COMPLETED | OUTPATIENT
Start: 2018-01-01 | End: 2018-01-01

## 2018-01-01 RX ORDER — PRAVASTATIN SODIUM 40 MG
40 TABLET ORAL DAILY
Qty: 90 TABLET | Refills: 2
Start: 2018-01-01

## 2018-01-01 RX ORDER — SODIUM CHLORIDE 9 MG/ML
50 INJECTION, SOLUTION INTRAVENOUS CONTINUOUS
Status: DISCONTINUED | OUTPATIENT
Start: 2018-01-01 | End: 2018-01-01

## 2018-01-01 RX ORDER — DONEPEZIL HYDROCHLORIDE 10 MG/1
10 TABLET, FILM COATED ORAL DAILY
Qty: 90 TABLET | Refills: 1
Start: 2018-01-01

## 2018-01-01 RX ORDER — SODIUM CHLORIDE 0.9 % (FLUSH) 0.9 %
10 SYRINGE (ML) INJECTION AS NEEDED
Status: DISCONTINUED | OUTPATIENT
Start: 2018-01-01 | End: 2018-01-01 | Stop reason: HOSPADM

## 2018-01-01 RX ORDER — L.ACID,PARA/B.BIFIDUM/S.THERM 8B CELL
1 CAPSULE ORAL DAILY
Status: DISCONTINUED | OUTPATIENT
Start: 2018-01-01 | End: 2018-01-01 | Stop reason: HOSPADM

## 2018-01-01 RX ORDER — DONEPEZIL HYDROCHLORIDE 10 MG/1
10 TABLET, FILM COATED ORAL NIGHTLY
Status: DISCONTINUED | OUTPATIENT
Start: 2018-01-01 | End: 2018-01-01 | Stop reason: HOSPADM

## 2018-01-01 RX ORDER — MEMANTINE HYDROCHLORIDE 28 MG/1
CAPSULE, EXTENDED RELEASE ORAL
Qty: 30 CAPSULE | Refills: 0 | Status: SHIPPED | OUTPATIENT
Start: 2018-01-01 | End: 2018-01-01 | Stop reason: SDUPTHER

## 2018-01-01 RX ORDER — QUETIAPINE FUMARATE 100 MG/1
100 TABLET, FILM COATED ORAL ONCE
Status: COMPLETED | OUTPATIENT
Start: 2018-01-01 | End: 2018-01-01

## 2018-01-01 RX ORDER — AMLODIPINE BESYLATE 2.5 MG/1
2.5 TABLET ORAL DAILY
Qty: 90 TABLET | Refills: 2
Start: 2018-01-01

## 2018-01-01 RX ORDER — QUETIAPINE FUMARATE 100 MG/1
100 TABLET, FILM COATED ORAL 2 TIMES DAILY
Status: DISCONTINUED | OUTPATIENT
Start: 2018-01-01 | End: 2018-01-01

## 2018-01-01 RX ORDER — LORAZEPAM 1 MG/1
1 TABLET ORAL EVERY 6 HOURS PRN
Status: DISCONTINUED | OUTPATIENT
Start: 2018-01-01 | End: 2018-01-01 | Stop reason: HOSPADM

## 2018-01-01 RX ORDER — LIDOCAINE HYDROCHLORIDE 10 MG/ML
10 INJECTION, SOLUTION INFILTRATION; PERINEURAL ONCE
Status: DISCONTINUED | OUTPATIENT
Start: 2018-01-01 | End: 2018-01-01

## 2018-01-01 RX ORDER — QUETIAPINE FUMARATE 100 MG/1
100 TABLET, FILM COATED ORAL 3 TIMES DAILY
Status: DISCONTINUED | OUTPATIENT
Start: 2018-01-01 | End: 2018-01-01 | Stop reason: HOSPADM

## 2018-01-01 RX ORDER — TAMSULOSIN HYDROCHLORIDE 0.4 MG/1
0.4 CAPSULE ORAL DAILY
Status: DISCONTINUED | OUTPATIENT
Start: 2018-01-01 | End: 2018-01-01 | Stop reason: HOSPADM

## 2018-01-01 RX ORDER — LORATADINE 10 MG/1
10 TABLET ORAL DAILY PRN
Qty: 30 TABLET | Refills: 0 | Status: SHIPPED | OUTPATIENT
Start: 2018-01-01

## 2018-01-01 RX ORDER — QUETIAPINE FUMARATE 50 MG/1
50 TABLET, FILM COATED ORAL 2 TIMES DAILY
Qty: 60 TABLET | Refills: 6 | OUTPATIENT
Start: 2018-01-01 | End: 2018-01-01

## 2018-01-01 RX ORDER — ACETAMINOPHEN 325 MG/1
650 TABLET ORAL EVERY 4 HOURS PRN
Status: DISCONTINUED | OUTPATIENT
Start: 2018-01-01 | End: 2018-01-01 | Stop reason: HOSPADM

## 2018-01-01 RX ORDER — LORAZEPAM 1 MG/1
1 TABLET ORAL EVERY 12 HOURS PRN
Qty: 14 TABLET | Refills: 0 | Status: SHIPPED | OUTPATIENT
Start: 2018-01-01 | End: 2018-01-01

## 2018-01-01 RX ORDER — TAMSULOSIN HYDROCHLORIDE 0.4 MG/1
1 CAPSULE ORAL NIGHTLY
Qty: 30 CAPSULE
Start: 2018-01-01

## 2018-01-01 RX ORDER — OMEPRAZOLE 20 MG/1
20 CAPSULE, DELAYED RELEASE ORAL
COMMUNITY

## 2018-01-01 RX ORDER — QUETIAPINE FUMARATE 100 MG/1
100 TABLET, FILM COATED ORAL NIGHTLY
Status: DISCONTINUED | OUTPATIENT
Start: 2018-01-01 | End: 2018-01-01

## 2018-01-01 RX ORDER — ACETAMINOPHEN 325 MG/1
650 TABLET ORAL EVERY 4 HOURS PRN
Start: 2018-01-01

## 2018-01-01 RX ORDER — LORAZEPAM 2 MG/ML
0.5 INJECTION INTRAMUSCULAR ONCE
Status: COMPLETED | OUTPATIENT
Start: 2018-01-01 | End: 2018-01-01

## 2018-01-01 RX ORDER — ATORVASTATIN CALCIUM 10 MG/1
10 TABLET, FILM COATED ORAL DAILY
Status: DISCONTINUED | OUTPATIENT
Start: 2018-01-01 | End: 2018-01-01 | Stop reason: HOSPADM

## 2018-01-01 RX ADMIN — ATORVASTATIN CALCIUM 10 MG: 10 TABLET, FILM COATED ORAL at 08:13

## 2018-01-01 RX ADMIN — LORAZEPAM 1 MG: 1 TABLET ORAL at 07:36

## 2018-01-01 RX ADMIN — TAMSULOSIN HYDROCHLORIDE 0.4 MG: 0.4 CAPSULE ORAL at 08:25

## 2018-01-01 RX ADMIN — QUETIAPINE FUMARATE 100 MG: 100 TABLET ORAL at 15:26

## 2018-01-01 RX ADMIN — POLYETHYLENE GLYCOL (3350) 17 G: 17 POWDER, FOR SOLUTION ORAL at 08:24

## 2018-01-01 RX ADMIN — LORAZEPAM 1 MG: 1 TABLET ORAL at 18:49

## 2018-01-01 RX ADMIN — HALOPERIDOL LACTATE 2 MG: 5 INJECTION, SOLUTION INTRAMUSCULAR at 06:16

## 2018-01-01 RX ADMIN — HEPARIN SODIUM 5000 UNITS: 5000 INJECTION, SOLUTION INTRAVENOUS; SUBCUTANEOUS at 08:54

## 2018-01-01 RX ADMIN — HALOPERIDOL LACTATE 1 MG: 5 INJECTION, SOLUTION INTRAMUSCULAR at 19:27

## 2018-01-01 RX ADMIN — CARVEDILOL 6.25 MG: 6.25 TABLET, FILM COATED ORAL at 17:44

## 2018-01-01 RX ADMIN — CARVEDILOL 6.25 MG: 6.25 TABLET, FILM COATED ORAL at 09:12

## 2018-01-01 RX ADMIN — LIDOCAINE HYDROCHLORIDE 5 ML: 10 INJECTION, SOLUTION EPIDURAL; INFILTRATION; INTRACAUDAL; PERINEURAL at 18:53

## 2018-01-01 RX ADMIN — Medication 1 CAPSULE: at 08:23

## 2018-01-01 RX ADMIN — MEMANTINE 10 MG: 10 TABLET ORAL at 20:02

## 2018-01-01 RX ADMIN — CARVEDILOL 6.25 MG: 6.25 TABLET, FILM COATED ORAL at 07:15

## 2018-01-01 RX ADMIN — LORAZEPAM 1 MG: 1 TABLET ORAL at 21:16

## 2018-01-01 RX ADMIN — QUETIAPINE FUMARATE 100 MG: 100 TABLET ORAL at 17:44

## 2018-01-01 RX ADMIN — TAMSULOSIN HYDROCHLORIDE 0.4 MG: 0.4 CAPSULE ORAL at 09:12

## 2018-01-01 RX ADMIN — SODIUM CHLORIDE 50 ML/HR: 9 INJECTION, SOLUTION INTRAVENOUS at 23:30

## 2018-01-01 RX ADMIN — HYDROCODONE BITARTRATE AND ACETAMINOPHEN 1 TABLET: 5; 325 TABLET ORAL at 00:47

## 2018-01-01 RX ADMIN — PANCRELIPASE 24000 UNITS OF LIPASE: 24000; 76000; 120000 CAPSULE, DELAYED RELEASE PELLETS ORAL at 17:00

## 2018-01-01 RX ADMIN — MEMANTINE 10 MG: 10 TABLET ORAL at 23:34

## 2018-01-01 RX ADMIN — QUETIAPINE FUMARATE 100 MG: 100 TABLET ORAL at 21:52

## 2018-01-01 RX ADMIN — ATORVASTATIN CALCIUM 10 MG: 10 TABLET, FILM COATED ORAL at 07:58

## 2018-01-01 RX ADMIN — QUETIAPINE FUMARATE 100 MG: 100 TABLET ORAL at 15:35

## 2018-01-01 RX ADMIN — MEMANTINE 10 MG: 10 TABLET ORAL at 07:15

## 2018-01-01 RX ADMIN — HEPARIN SODIUM 5000 UNITS: 5000 INJECTION, SOLUTION INTRAVENOUS; SUBCUTANEOUS at 09:10

## 2018-01-01 RX ADMIN — PANCRELIPASE 24000 UNITS OF LIPASE: 24000; 76000; 120000 CAPSULE, DELAYED RELEASE PELLETS ORAL at 07:37

## 2018-01-01 RX ADMIN — CARVEDILOL 6.25 MG: 6.25 TABLET, FILM COATED ORAL at 17:49

## 2018-01-01 RX ADMIN — QUETIAPINE FUMARATE 100 MG: 100 TABLET ORAL at 08:18

## 2018-01-01 RX ADMIN — DONEPEZIL HYDROCHLORIDE 10 MG: 10 TABLET, FILM COATED ORAL at 21:12

## 2018-01-01 RX ADMIN — CARVEDILOL 6.25 MG: 6.25 TABLET, FILM COATED ORAL at 07:36

## 2018-01-01 RX ADMIN — POLYETHYLENE GLYCOL (3350) 17 G: 17 POWDER, FOR SOLUTION ORAL at 07:58

## 2018-01-01 RX ADMIN — LORAZEPAM 1 MG: 1 TABLET ORAL at 09:10

## 2018-01-01 RX ADMIN — LORAZEPAM 1 MG: 1 TABLET ORAL at 20:59

## 2018-01-01 RX ADMIN — PANTOPRAZOLE SODIUM 40 MG: 40 TABLET, DELAYED RELEASE ORAL at 06:04

## 2018-01-01 RX ADMIN — HEPARIN SODIUM 5000 UNITS: 5000 INJECTION, SOLUTION INTRAVENOUS; SUBCUTANEOUS at 07:16

## 2018-01-01 RX ADMIN — PANCRELIPASE 24000 UNITS OF LIPASE: 24000; 76000; 120000 CAPSULE, DELAYED RELEASE PELLETS ORAL at 17:49

## 2018-01-01 RX ADMIN — ACETAMINOPHEN 650 MG: 325 TABLET, FILM COATED ORAL at 17:55

## 2018-01-01 RX ADMIN — Medication 1 CAPSULE: at 08:25

## 2018-01-01 RX ADMIN — LORAZEPAM 1 MG: 1 TABLET ORAL at 17:09

## 2018-01-01 RX ADMIN — TAMSULOSIN HYDROCHLORIDE 0.4 MG: 0.4 CAPSULE ORAL at 08:13

## 2018-01-01 RX ADMIN — HALOPERIDOL LACTATE 2 MG: 5 INJECTION, SOLUTION INTRAMUSCULAR at 13:01

## 2018-01-01 RX ADMIN — PANCRELIPASE 24000 UNITS OF LIPASE: 24000; 76000; 120000 CAPSULE, DELAYED RELEASE PELLETS ORAL at 07:33

## 2018-01-01 RX ADMIN — LORAZEPAM 0.5 MG: 2 INJECTION, SOLUTION INTRAMUSCULAR; INTRAVENOUS at 17:31

## 2018-01-01 RX ADMIN — HEPARIN SODIUM 5000 UNITS: 5000 INJECTION, SOLUTION INTRAVENOUS; SUBCUTANEOUS at 20:00

## 2018-01-01 RX ADMIN — LORAZEPAM 1 MG: 1 TABLET ORAL at 09:25

## 2018-01-01 RX ADMIN — POLYETHYLENE GLYCOL (3350) 17 G: 17 POWDER, FOR SOLUTION ORAL at 07:16

## 2018-01-01 RX ADMIN — DONEPEZIL HYDROCHLORIDE 10 MG: 10 TABLET, FILM COATED ORAL at 21:52

## 2018-01-01 RX ADMIN — HEPARIN SODIUM 5000 UNITS: 5000 INJECTION, SOLUTION INTRAVENOUS; SUBCUTANEOUS at 08:13

## 2018-01-01 RX ADMIN — ATORVASTATIN CALCIUM 10 MG: 10 TABLET, FILM COATED ORAL at 07:15

## 2018-01-01 RX ADMIN — PANCRELIPASE 24000 UNITS OF LIPASE: 24000; 76000; 120000 CAPSULE, DELAYED RELEASE PELLETS ORAL at 11:17

## 2018-01-01 RX ADMIN — Medication 1 CAPSULE: at 07:15

## 2018-01-01 RX ADMIN — PANCRELIPASE 24000 UNITS OF LIPASE: 24000; 76000; 120000 CAPSULE, DELAYED RELEASE PELLETS ORAL at 11:23

## 2018-01-01 RX ADMIN — TAMSULOSIN HYDROCHLORIDE 0.4 MG: 0.4 CAPSULE ORAL at 08:50

## 2018-01-01 RX ADMIN — MEMANTINE 10 MG: 10 TABLET ORAL at 21:12

## 2018-01-01 RX ADMIN — QUETIAPINE FUMARATE 100 MG: 100 TABLET ORAL at 07:14

## 2018-01-01 RX ADMIN — DONEPEZIL HYDROCHLORIDE 10 MG: 10 TABLET, FILM COATED ORAL at 20:00

## 2018-01-01 RX ADMIN — HALOPERIDOL LACTATE 2 MG: 5 INJECTION, SOLUTION INTRAMUSCULAR at 19:09

## 2018-01-01 RX ADMIN — QUETIAPINE FUMARATE 100 MG: 100 TABLET ORAL at 21:57

## 2018-01-01 RX ADMIN — ACETAMINOPHEN 650 MG: 325 TABLET, FILM COATED ORAL at 16:15

## 2018-01-01 RX ADMIN — ATORVASTATIN CALCIUM 10 MG: 10 TABLET, FILM COATED ORAL at 09:12

## 2018-01-01 RX ADMIN — QUETIAPINE FUMARATE 100 MG: 100 TABLET ORAL at 21:12

## 2018-01-01 RX ADMIN — CARVEDILOL 6.25 MG: 6.25 TABLET, FILM COATED ORAL at 08:28

## 2018-01-01 RX ADMIN — TAMSULOSIN HYDROCHLORIDE 0.4 MG: 0.4 CAPSULE ORAL at 07:15

## 2018-01-01 RX ADMIN — CARVEDILOL 6.25 MG: 6.25 TABLET, FILM COATED ORAL at 17:19

## 2018-01-01 RX ADMIN — PANCRELIPASE 24000 UNITS OF LIPASE: 24000; 76000; 120000 CAPSULE, DELAYED RELEASE PELLETS ORAL at 17:06

## 2018-01-01 RX ADMIN — HEPARIN SODIUM 5000 UNITS: 5000 INJECTION, SOLUTION INTRAVENOUS; SUBCUTANEOUS at 08:32

## 2018-01-01 RX ADMIN — PANCRELIPASE 24000 UNITS OF LIPASE: 24000; 76000; 120000 CAPSULE, DELAYED RELEASE PELLETS ORAL at 07:15

## 2018-01-01 RX ADMIN — ACETAMINOPHEN 650 MG: 325 TABLET, FILM COATED ORAL at 07:50

## 2018-01-01 RX ADMIN — PANCRELIPASE 24000 UNITS OF LIPASE: 24000; 76000; 120000 CAPSULE, DELAYED RELEASE PELLETS ORAL at 06:36

## 2018-01-01 RX ADMIN — ACETAMINOPHEN 650 MG: 325 TABLET, FILM COATED ORAL at 20:00

## 2018-01-01 RX ADMIN — QUETIAPINE FUMARATE 100 MG: 100 TABLET ORAL at 22:48

## 2018-01-01 RX ADMIN — ATORVASTATIN CALCIUM 10 MG: 10 TABLET, FILM COATED ORAL at 08:27

## 2018-01-01 RX ADMIN — PANCRELIPASE 24000 UNITS OF LIPASE: 24000; 76000; 120000 CAPSULE, DELAYED RELEASE PELLETS ORAL at 17:19

## 2018-01-01 RX ADMIN — Medication 10 MG: at 20:00

## 2018-01-01 RX ADMIN — PANCRELIPASE 24000 UNITS OF LIPASE: 24000; 76000; 120000 CAPSULE, DELAYED RELEASE PELLETS ORAL at 12:00

## 2018-01-01 RX ADMIN — MEMANTINE 10 MG: 10 TABLET ORAL at 08:57

## 2018-01-01 RX ADMIN — TAMSULOSIN HYDROCHLORIDE 0.4 MG: 0.4 CAPSULE ORAL at 08:23

## 2018-01-01 RX ADMIN — MEMANTINE 10 MG: 10 TABLET ORAL at 22:22

## 2018-01-01 RX ADMIN — Medication 1 CAPSULE: at 08:50

## 2018-01-01 RX ADMIN — SODIUM CHLORIDE 500 ML: 9 INJECTION, SOLUTION INTRAVENOUS at 17:30

## 2018-01-01 RX ADMIN — QUETIAPINE FUMARATE 100 MG: 100 TABLET ORAL at 09:11

## 2018-01-01 RX ADMIN — QUETIAPINE FUMARATE 100 MG: 100 TABLET ORAL at 09:40

## 2018-01-01 RX ADMIN — ACETAMINOPHEN 650 MG: 325 TABLET, FILM COATED ORAL at 09:25

## 2018-01-01 RX ADMIN — Medication 1 CAPSULE: at 07:57

## 2018-01-01 RX ADMIN — Medication 10 MG: at 05:37

## 2018-01-01 RX ADMIN — PANCRELIPASE 24000 UNITS OF LIPASE: 24000; 76000; 120000 CAPSULE, DELAYED RELEASE PELLETS ORAL at 11:48

## 2018-01-01 RX ADMIN — CARVEDILOL 6.25 MG: 6.25 TABLET, FILM COATED ORAL at 17:09

## 2018-01-01 RX ADMIN — PANTOPRAZOLE SODIUM 40 MG: 40 TABLET, DELAYED RELEASE ORAL at 06:09

## 2018-01-01 RX ADMIN — DONEPEZIL HYDROCHLORIDE 10 MG: 10 TABLET, FILM COATED ORAL at 22:22

## 2018-01-01 RX ADMIN — LORAZEPAM 1.5 MG: 2 INJECTION INTRAMUSCULAR; INTRAVENOUS at 19:29

## 2018-01-01 RX ADMIN — LORAZEPAM 1 MG: 1 TABLET ORAL at 07:19

## 2018-01-01 RX ADMIN — ACETAMINOPHEN 650 MG: 325 TABLET, FILM COATED ORAL at 04:39

## 2018-01-01 RX ADMIN — MEMANTINE 10 MG: 10 TABLET ORAL at 08:14

## 2018-01-01 RX ADMIN — MIRTAZAPINE 15 MG: 15 TABLET, FILM COATED ORAL at 22:47

## 2018-01-01 RX ADMIN — HEPARIN SODIUM 5000 UNITS: 5000 INJECTION, SOLUTION INTRAVENOUS; SUBCUTANEOUS at 22:17

## 2018-01-01 RX ADMIN — PANTOPRAZOLE SODIUM 40 MG: 40 TABLET, DELAYED RELEASE ORAL at 07:33

## 2018-01-01 RX ADMIN — PANCRELIPASE 24000 UNITS OF LIPASE: 24000; 76000; 120000 CAPSULE, DELAYED RELEASE PELLETS ORAL at 17:44

## 2018-01-01 RX ADMIN — PANTOPRAZOLE SODIUM 40 MG: 40 TABLET, DELAYED RELEASE ORAL at 08:04

## 2018-01-01 RX ADMIN — Medication 1 CAPSULE: at 09:12

## 2018-01-01 RX ADMIN — ATORVASTATIN CALCIUM 10 MG: 10 TABLET, FILM COATED ORAL at 08:50

## 2018-01-01 RX ADMIN — MEMANTINE 10 MG: 10 TABLET ORAL at 10:11

## 2018-01-01 RX ADMIN — HALOPERIDOL LACTATE 1 MG: 5 INJECTION, SOLUTION INTRAMUSCULAR at 01:48

## 2018-01-01 RX ADMIN — PANTOPRAZOLE SODIUM 40 MG: 40 TABLET, DELAYED RELEASE ORAL at 06:16

## 2018-01-01 RX ADMIN — LORAZEPAM 1 MG: 1 TABLET ORAL at 14:13

## 2018-01-01 RX ADMIN — TAMSULOSIN HYDROCHLORIDE 0.4 MG: 0.4 CAPSULE ORAL at 07:57

## 2018-01-01 RX ADMIN — PANCRELIPASE 24000 UNITS OF LIPASE: 24000; 76000; 120000 CAPSULE, DELAYED RELEASE PELLETS ORAL at 17:09

## 2018-01-01 RX ADMIN — ACETAMINOPHEN 650 MG: 325 TABLET, FILM COATED ORAL at 17:14

## 2018-01-01 RX ADMIN — QUETIAPINE FUMARATE 100 MG: 100 TABLET ORAL at 08:23

## 2018-01-01 RX ADMIN — QUETIAPINE FUMARATE 100 MG: 100 TABLET ORAL at 20:00

## 2018-01-01 RX ADMIN — HALOPERIDOL LACTATE 2 MG: 5 INJECTION, SOLUTION INTRAMUSCULAR at 03:47

## 2018-01-01 RX ADMIN — ATORVASTATIN CALCIUM 10 MG: 10 TABLET, FILM COATED ORAL at 08:32

## 2018-01-01 RX ADMIN — HEPARIN SODIUM 5000 UNITS: 5000 INJECTION, SOLUTION INTRAVENOUS; SUBCUTANEOUS at 21:12

## 2018-01-01 RX ADMIN — CARVEDILOL 6.25 MG: 6.25 TABLET, FILM COATED ORAL at 08:13

## 2018-01-01 RX ADMIN — QUETIAPINE FUMARATE 100 MG: 100 TABLET ORAL at 20:03

## 2018-01-01 RX ADMIN — PANCRELIPASE 24000 UNITS OF LIPASE: 24000; 76000; 120000 CAPSULE, DELAYED RELEASE PELLETS ORAL at 07:58

## 2018-01-01 RX ADMIN — POLYETHYLENE GLYCOL (3350) 17 G: 17 POWDER, FOR SOLUTION ORAL at 11:23

## 2018-01-01 RX ADMIN — MEMANTINE 10 MG: 10 TABLET ORAL at 08:24

## 2018-01-01 RX ADMIN — HALOPERIDOL LACTATE 2 MG: 5 INJECTION, SOLUTION INTRAMUSCULAR at 20:03

## 2018-01-01 RX ADMIN — PANCRELIPASE 24000 UNITS OF LIPASE: 24000; 76000; 120000 CAPSULE, DELAYED RELEASE PELLETS ORAL at 07:50

## 2018-01-01 RX ADMIN — Medication 1 CAPSULE: at 08:13

## 2018-01-01 RX ADMIN — Medication 10 MG: at 00:47

## 2018-01-01 RX ADMIN — CARVEDILOL 6.25 MG: 6.25 TABLET, FILM COATED ORAL at 17:06

## 2018-01-01 RX ADMIN — LORAZEPAM 1 MG: 1 TABLET ORAL at 07:14

## 2018-01-01 RX ADMIN — QUETIAPINE FUMARATE 100 MG: 100 TABLET ORAL at 15:56

## 2018-01-01 RX ADMIN — CARVEDILOL 6.25 MG: 6.25 TABLET, FILM COATED ORAL at 17:00

## 2018-01-01 RX ADMIN — PANTOPRAZOLE SODIUM 40 MG: 40 TABLET, DELAYED RELEASE ORAL at 06:25

## 2018-01-01 RX ADMIN — BACITRACIN, NEOMYCIN, POLYMYXIN B 1 APPLICATION: 400; 3.5; 5 OINTMENT TOPICAL at 19:44

## 2018-01-01 RX ADMIN — Medication 10 MG: at 04:34

## 2018-01-01 RX ADMIN — HEPARIN SODIUM 5000 UNITS: 5000 INJECTION, SOLUTION INTRAVENOUS; SUBCUTANEOUS at 08:25

## 2018-01-01 RX ADMIN — LORAZEPAM 1 MG: 1 TABLET ORAL at 17:44

## 2018-01-01 RX ADMIN — HALOPERIDOL LACTATE 0.5 MG: 5 INJECTION, SOLUTION INTRAMUSCULAR at 05:23

## 2018-01-01 RX ADMIN — CARVEDILOL 6.25 MG: 6.25 TABLET, FILM COATED ORAL at 23:30

## 2018-01-01 RX ADMIN — CARVEDILOL 6.25 MG: 6.25 TABLET, FILM COATED ORAL at 07:33

## 2018-01-01 RX ADMIN — QUETIAPINE FUMARATE 100 MG: 100 TABLET ORAL at 22:20

## 2018-01-01 RX ADMIN — CARVEDILOL 6.25 MG: 6.25 TABLET, FILM COATED ORAL at 07:57

## 2018-01-01 RX ADMIN — DONEPEZIL HYDROCHLORIDE 10 MG: 10 TABLET, FILM COATED ORAL at 22:47

## 2018-01-01 RX ADMIN — HEPARIN SODIUM 5000 UNITS: 5000 INJECTION, SOLUTION INTRAVENOUS; SUBCUTANEOUS at 20:03

## 2018-01-01 RX ADMIN — POLYETHYLENE GLYCOL (3350) 17 G: 17 POWDER, FOR SOLUTION ORAL at 08:50

## 2018-01-01 RX ADMIN — HEPARIN SODIUM 5000 UNITS: 5000 INJECTION, SOLUTION INTRAVENOUS; SUBCUTANEOUS at 21:52

## 2018-01-01 RX ADMIN — HEPARIN SODIUM 5000 UNITS: 5000 INJECTION, SOLUTION INTRAVENOUS; SUBCUTANEOUS at 22:47

## 2018-01-01 RX ADMIN — MEMANTINE 10 MG: 10 TABLET ORAL at 11:22

## 2018-01-01 RX ADMIN — DONEPEZIL HYDROCHLORIDE 10 MG: 10 TABLET, FILM COATED ORAL at 20:04

## 2018-01-01 RX ADMIN — PANCRELIPASE 24000 UNITS OF LIPASE: 24000; 76000; 120000 CAPSULE, DELAYED RELEASE PELLETS ORAL at 09:14

## 2018-01-01 RX ADMIN — MEMANTINE 10 MG: 10 TABLET ORAL at 21:52

## 2018-01-01 RX ADMIN — HALOPERIDOL LACTATE 2 MG: 5 INJECTION, SOLUTION INTRAMUSCULAR at 11:47

## 2018-01-01 RX ADMIN — LORAZEPAM 1 MG: 1 TABLET ORAL at 05:48

## 2018-01-01 RX ADMIN — PANCRELIPASE 24000 UNITS OF LIPASE: 24000; 76000; 120000 CAPSULE, DELAYED RELEASE PELLETS ORAL at 11:44

## 2018-01-01 RX ADMIN — MEMANTINE 10 MG: 10 TABLET ORAL at 20:00

## 2018-01-09 NOTE — TELEPHONE ENCOUNTER
Thais,  Updated:  Patient's wife called. She stated patient has started Creon. Bowel movements has slowed down. She wanted you to let Dr Rubi know. Thanks

## 2018-01-15 NOTE — TELEPHONE ENCOUNTER
Thais,  Patient's wife called. She stated that patient has finished Creon. Medication has helped. Do Dr Rubi want to keep patient on this medication? If so please send medication to  His pharmacy. Also call wife back. Thanks

## 2018-01-17 NOTE — TELEPHONE ENCOUNTER
Thais, I have ordered Creon (generic) 24,000 units of lipase 3 times a day before meals for 90 days with 3 refills.  I will try to call Mrs. Aguilar later today.  Josef Rubi MD

## 2018-01-24 NOTE — PROGRESS NOTES
Discharge Planning Assessment  [facility]     Patient Name: Hayder Aguilar  MRN: [id number]  Today's Date: 1/24/2018    Admit Date: 1/24/2018          Discharge Needs Assessment     None            Discharge Plan       01/24/18 1751    Case Management/Social Work Plan    Plan d/c    Additional Comments CM called by [facility] [employer] [facility]BO[employer] to provide pt and family with private sitter info. CM spoke with Pts spouse and daughter. They have a private sitter 9-9 and are looking for overnight person but are not interested in company people. Daughter did take the list. CM suggested they get references from current sitter they are using and asked if there was anything else [employer] might assist with but they stated no. CM also left family with business card        Discharge Placement     No information found                Demographic Summary     None            Functional Status     None            Psychosocial     None            Abuse/Neglect     None            Legal     None            Substance Abuse     None            Patient Forms     None          Leonarda Dominguez
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
## 2018-01-24 NOTE — DISCHARGE INSTRUCTIONS
Be sure to have around-the-clock care and use all devices with caution to avoid any fall injury.  Return patient if fever, or problems sooner.
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
## 2018-01-25 NOTE — TELEPHONE ENCOUNTER
----- Message from Elsa Green sent at 1/25/2018  9:29 AM EST -----  Contact: Angela Freedman,    Angela called and wanted to made a follow up for Hayder to see Amos after taking him to the hospital yesterday and I got him in tomorrow at 1:30. She also requested you to call her back today whenever you are able to at [phone]    Thanks!
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
## 2018-01-26 NOTE — PROGRESS NOTES
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 He has noted symptoms since at least 2014 marked initially by forgetfulness and repetitiveness. This has gradually worsened  over time. Additional symptoms have included impairments in essentially all spheres of cognition. He has had associated  symptoms of hallucinations.  He is no longer driving . He is currently residing at home with his wife.       Prior evaluation has included screening blood work  and an MRI of the brain which was unremarkable . He is currently taking donepezil and memantine. He has failed Geodon and Zyprexa.      Since his last visit on 11/1/17 he has continued to decline cognitively and physically. His hallucinations are now severe. He is increasingly aphasic. He is unsteady. He was seen in the ED on 1/24/18 due to his relatively dramatic decline cognitively, with an unrevealing medical evaluation. He is having increased falls.      The following portions of the patient's history were reviewed and updated as appropriate: current medications, past family history, past medical history and past social history.    Review of Systems   Unable to perform ROS: Dementia       Objective     /82  Ht 162.6 cm (64\")  Wt 63.5 kg (140 lb)  BMI 24.03 kg/m2     Neurologic Exam     Mental Status   Oriented to person.   Disoriented to place.   Disoriented to time.   Attention: decreased.   Level of consciousness: drowsy  Knowledge: poor.        His speech is hypophonic and he is aphasic     Cranial Nerves   Cranial nerves II through XII intact.     Motor Exam   Muscle bulk: normal  Right arm tone: increased  Left arm tone: increased    Strength   Strength 5/5 throughout.     Gait, Coordination, and Reflexes     Gait  Gait: shuffling (decreased arm swing)      MMSE=untestable      Assessment/Plan   Hayder was seen today for memory loss.    Diagnoses and all " orders for this visit:    Lewy body dementia without behavioral disturbance  -     Ambulatory Referral to Home Health    Other orders  -     QUEtiapine (SEROquel) 50 MG tablet; Take 1 tablet by mouth 2 (Two) Times a Day.          Hayder Aguilar returns to clinic today with a history of Dementia with Lewy Bodies. I again reviewed his current status and treatment options. After discussing potential treatment options, it was elected to continue on  donepezil and memantine unchanged. I will add Seroquel for hallucinations and agitation, and consult Home Health. I discussed my strong concern that he will not be able to remain at home due to the severity of his dementia, both physically and cognitively.He will then follow up in 3 months , or sooner if needed.       I spent 30  minutes face to face with the patient and family. I spent 20 minutes of this time counseling and discussing diagnosis, prognosis, evaluation, current status, treatment options and management.    As part of this visit I obtained additional history from the family which is incorporated in the HPI.    Mat Trinidad MD

## 2018-02-05 NOTE — TELEPHONE ENCOUNTER
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 Angela would like to know if patient still needs to take Omeprazole as well.

## 2018-02-07 NOTE — TELEPHONE ENCOUNTER
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 Aguilar know that omeprazole is needed with the Creon to protect it from being degraded by stomach acid. So, he is to stay on Omeprazole as long as on Creon.  Josef Rubi MD

## 2018-02-12 NOTE — TELEPHONE ENCOUNTER
Her only option appears to be to buy OTC Nexium or Omeprazole and take one every morning 1/2 hour before eating.  Josef Rubi MD

## 2018-02-12 NOTE — TELEPHONE ENCOUNTER
Informed  and let her know Hayder should be taking the Omeprazole with the Creon per . She states that their insurance will not cover the omeprazole. Is there anything else he can take in it's place?

## 2018-02-14 NOTE — DISCHARGE INSTRUCTIONS
Change the Seroquel to 50 mg each morning and 100 mg at night to see if that helps the restlessness and hallucinations.  Return to the ER as needed for any further worrisome problems.

## 2018-02-14 NOTE — ED PROVIDER NOTES
Subjective   HPI Comments: Hayder Aguilar is a 82 y.o.male with a history of dementia who presents to the ED after a fall. Yesterday the patient was ambulating when he fell backwards, landing on his back. Since that time he has complained of back pain, but he cannot specify which portion of the back.    Today he was ambulating with assistance from his caregiver when he suddenly collapsed. Per the caregiver, his legs suddenly gave out and he was helped to the ground. The assistance to the ground was slow and not a likely mechanism for injury. She denies any loss of consciousness. He is brought to the ED for evaluation. The majority of his history is provided by his caretaker and family. They deny loss of consciousness or any other acute symptoms at this time. They report worsening confusion, which is secondary to his dementia. His only new medication is Seroquel, beginning three weeks ago.      Patient is a 82 y.o. male presenting with fall.   History provided by:  Relative  History limited by:  Dementia  Fall   Mechanism of injury: fall    Injury location:  Torso  Torso injury location:  Back  Incident location:  Home  Time since incident: two falls in the past 24 hours.  Arrived directly from scene: Arrived after most recent fall.    Fall:     Fall occurred:  Walking    Impact surface:  Hard floor    Point of impact:  Back    Entrapped after fall: no    Protective equipment: none    Suspicion of alcohol use: no    Suspicion of drug use: no    Prior to arrival data:     Patient ambulatory at scene: yes      Loss of consciousness: no      Amnesic to event: no    Associated symptoms: back pain    Associated symptoms: no loss of consciousness        Review of Systems   Unable to perform ROS: Dementia   Musculoskeletal: Positive for back pain.   Neurological: Negative for loss of consciousness and syncope.   Psychiatric/Behavioral: Positive for confusion.       Past Medical History:   Diagnosis Date   • Borderline  abnormal carotid duplex    • Chronic kidney disease, stage 3    • Cognitive dysfunction    • Depression 12/29/2016   • Diverticulosis 12/29/2016   • Dyslipidemia    • Fracture    • GERD (gastroesophageal reflux disease)    • History of chronic kidney disease    • Hyperlipidemia    • Hypertension    • Insomnia 12/29/2016   • MCI (mild cognitive impairment)    • Memory loss    • Partial retinal detachment    • Pyrosis    • Shuffling gait    • Syncopal episodes 12/29/2016   • Umbilical hernia 12/29/2016   • Visual hallucinations    • Vitamin D deficiency 12/29/2016       Allergies   Allergen Reactions   • Other        Past Surgical History:   Procedure Laterality Date   • CIRCUMCISION     • HERNIA REPAIR     • OTHER SURGICAL HISTORY       right femur fracture status post surgery, autumn 2012.   • OTHER SURGICAL HISTORY  06/2014    implantable loop recorder implant   • TONSILLECTOMY         Family History   Problem Relation Age of Onset   • Dementia Other    • Hypertension Other    • Pulmonary embolism Mother    • Stroke Mother    • Stroke Father        Social History     Social History   • Marital status:      Spouse name: N/A   • Number of children: N/A   • Years of education: N/A     Social History Main Topics   • Smoking status: Former Smoker     Types: Pipe     Quit date: 1965   • Smokeless tobacco: Never Used   • Alcohol use Yes      Comment: rarely   • Drug use: No   • Sexual activity: Defer     Other Topics Concern   • None     Social History Narrative         Objective   Physical Exam   Constitutional: He appears well-developed and well-nourished. No distress.   HENT:   Head: Normocephalic and atraumatic.   Mouth/Throat: No oropharyngeal exudate.   Airway patent.   Eyes: Conjunctivae are normal. No scleral icterus.   Neck: Normal range of motion. Neck supple. No JVD present.   Cardiovascular: Regular rhythm and normal heart sounds.  Bradycardia present.  Exam reveals no gallop and no friction rub.    No  murmur heard.  Pulmonary/Chest: Effort normal and breath sounds normal. No respiratory distress. He has no wheezes. He has no rales.   Abdominal: Soft. Bowel sounds are normal. He exhibits no distension. There is no tenderness. There is no rebound and no guarding.   Musculoskeletal: Normal range of motion. He exhibits no edema or tenderness.   Could not elicit any TTP over his spine.   Neurological: He is alert.   3/5 strength on the left lower extremity and 4/5 on the right lower extremity. The exam was not reproducible. Plantar flexion 5/5.  strength 5/5. No pronator drift.   Skin: Skin is warm and dry. He is not diaphoretic.   No bruises noticed over his spine.   Nursing note and vitals reviewed.      Procedures         ED Course  ED Course   Comment By Time   He became increasingly restless  while here, trying to crawl out of bed, resisting family.  He had just started Seroquel with some improvement the first few doses, but decline since then.  Suggested increasing the dose and family agreed. Zelalem Baeza MD 02/13 2238     Recent Results (from the past 24 hour(s))   Comprehensive Metabolic Panel    Collection Time: 02/13/18  8:00 PM   Result Value Ref Range    Glucose 93 70 - 100 mg/dL    BUN 29 (H) 9 - 23 mg/dL    Creatinine 1.60 (H) 0.60 - 1.30 mg/dL    Sodium 141 132 - 146 mmol/L    Potassium 4.1 3.5 - 5.5 mmol/L    Chloride 111 (H) 99 - 109 mmol/L    CO2 23.0 20.0 - 31.0 mmol/L    Calcium 9.1 8.7 - 10.4 mg/dL    Total Protein 6.4 5.7 - 8.2 g/dL    Albumin 3.60 3.20 - 4.80 g/dL    ALT (SGPT) 36 7 - 40 U/L    AST (SGOT) 30 0 - 33 U/L    Alkaline Phosphatase 106 (H) 25 - 100 U/L    Total Bilirubin 0.5 0.3 - 1.2 mg/dL    eGFR Non African Amer 42 (L) >60 mL/min/1.73    Globulin 2.8 gm/dL    A/G Ratio 1.3 (L) 1.5 - 2.5 g/dL    BUN/Creatinine Ratio 18.1 7.0 - 25.0    Anion Gap 7.0 3.0 - 11.0 mmol/L   CBC Auto Differential    Collection Time: 02/13/18  8:00 PM   Result Value Ref Range    WBC 4.98 3.50 - 10.80  10*3/mm3    RBC 4.05 (L) 4.20 - 5.76 10*6/mm3    Hemoglobin 13.2 13.1 - 17.5 g/dL    Hematocrit 39.3 38.9 - 50.9 %    MCV 97.0 80.0 - 99.0 fL    MCH 32.6 (H) 27.0 - 31.0 pg    MCHC 33.6 32.0 - 36.0 g/dL    RDW 12.9 11.3 - 14.5 %    RDW-SD 45.9 37.0 - 54.0 fl    MPV 11.6 6.0 - 12.0 fL    Platelets 103 (L) 150 - 450 10*3/mm3    Neutrophil % 65.1 41.0 - 71.0 %    Lymphocyte % 21.7 (L) 24.0 - 44.0 %    Monocyte % 8.2 0.0 - 12.0 %    Eosinophil % 4.6 (H) 0.0 - 3.0 %    Basophil % 0.2 0.0 - 1.0 %    Immature Grans % 0.2 0.0 - 0.6 %    Neutrophils, Absolute 3.24 1.50 - 8.30 10*3/mm3    Lymphocytes, Absolute 1.08 0.60 - 4.80 10*3/mm3    Monocytes, Absolute 0.41 0.00 - 1.00 10*3/mm3    Eosinophils, Absolute 0.23 0.00 - 0.30 10*3/mm3    Basophils, Absolute 0.01 0.00 - 0.20 10*3/mm3    Immature Grans, Absolute 0.01 0.00 - 0.03 10*3/mm3     Note: In addition to lab results from this visit, the labs listed above may include labs taken at another facility or during a different encounter within the last 24 hours. Please correlate lab times with ED admission and discharge times for further clarification of the services performed during this visit.    CT Thoracic Spine Without Contrast   Final Result   1.  No acute thoracic spine fracture.   2.  Subacute versus chronic fracture of right 10th rib at the costovertebral    junction with callus formation.      THIS DOCUMENT HAS BEEN ELECTRONICALLY SIGNED BY GLADIS ANAND MD      CT Lumbar Spine Without Contrast   Final Result   1.  Evidence of acute mild (less than 25% height loss) L1 inferior endplate    vertebral compression fracture.   2.  L1 superior endplate chronic compression fracture.   3.  Straightening of normal lumbar lordosis can be associated with muscular    spasm.      THIS DOCUMENT HAS BEEN ELECTRONICALLY SIGNED BY GLADIS ANAND MD      CT Head Without Contrast   Final Result   1.  No acute abnormality.   2.  Generalized cerebral involutional change, age-appropriate.   3.   Chronic microvascular ischemic disease.      THIS DOCUMENT HAS BEEN ELECTRONICALLY SIGNED BY GLADIS ANAND MD        Vitals:    02/13/18 1832 02/13/18 1900 02/13/18 1930 02/13/18 2000   BP:  132/83 169/85 157/88   BP Location:       Patient Position:       Pulse: 50 50  52   Resp:       Temp:       TempSrc:       SpO2: 100% 93%  97%   Weight:       Height:         Medications   QUEtiapine (SEROquel) tablet 100 mg (100 mg Oral Given 2/13/18 2157)     ECG/EMG Results (last 24 hours)     ** No results found for the last 24 hours. **                    MDM    Final diagnoses:   Weakness   Lewy body dementia with behavioral disturbance   Closed compression fracture of first lumbar vertebra, initial encounter       Documentation assistance provided by sofia Frazier.  Information recorded by the scribe was done at my direction and has been verified and validated by me.     Danny Frazier  02/13/18 1941       Zelalem Baeza MD  02/13/18 [id number]

## 2018-02-16 NOTE — TELEPHONE ENCOUNTER
Daisy called back I informed her of  instructions, she verbalized understanding and would like to try adding mirtazapine, I also scheduled f/u for pt on 3-2-18 @ 3:30. She verbalized understanding and will call back if needed.

## 2018-02-16 NOTE — TELEPHONE ENCOUNTER
"----- Message from Mat Trinidad MD sent at 2/16/2018 11:57 AM EST -----  Contact: Daisy Aguilar (daughter)  I'm actually more concerned that the problem is lack of efficacy with Seroquel. Ideally, I would give the increase in dosage more time to work. Other options include switching from Seroquel to Risperdal, or adding mirtazapine 15 mg qhs to see if the combination is more effective. Thanks.    ----- Message -----     From: Patricia Danielle Snellen, CMA     Sent: 2/16/2018  11:42 AM       To: Mat Trinidad MD    Returned Daisy's call she states pt is taking Seroquel 100 mg tid since Wednesday. (pt was at ER on Tuesday) she states he is hitting, biting, and spitting, she is concerned that he is having an adverse reaction to medication.  ----- Message -----     From: Mat Trinidad MD     Sent: 2/16/2018  11:18 AM       To: Patricia Danielle Snellen, [facility]    Please try to find out her concern with Seroquel and confirm his current med's. I can then offer to make adjustments, or see him in clinic. Thanks.    ----- Message -----     From: Patricia Danielle Snellen, CMA     Sent: 2/16/2018  10:30 AM       To: Mat Trinidad MD        ----- Message -----     From: Elsa Green     Sent: 2/16/2018  10:05 AM       To: [facility] [place]    Amos Villa called in regards to her father, Hayder.  She said she needed to speak to Amos about some medicine adjustments for her father because he is \"completely out of control\".  They went to [facility] and he tripled his Seroquel after an ER visit and she thinks Hayder is having an adverse side effect to it.    Please call Daisy back  [phone]        "

## 2018-02-20 PROBLEM — R41.0 CONFUSION: Status: ACTIVE | Noted: 2018-01-01

## 2018-02-23 PROBLEM — R19.7 DIARRHEA: Status: RESOLVED | Noted: 2017-01-01 | Resolved: 2018-01-01

## 2018-02-23 NOTE — TELEPHONE ENCOUNTER
----- Message from Mat Trinidad MD sent at 2/23/2018  2:56 PM EST -----  Contact: Daisy Aguilar (Daughter)  Done. Thanks.    ----- Message -----     From: Patricia Danielle Snellen, CMA     Sent: 2/23/2018   1:25 PM       To: Mat Trinidad MD     Please put in new order and I will send to home health, thanks   ----- Message -----     From: Mat Trinidad MD     Sent: 2/23/2018   1:20 PM       To: Patricia Danielle Snellen, CMA    This is reasonable. Do we need a new Home Health order? Is this already being arranged upon discharge from the hospital?     ----- Message -----     From: Patricia Danielle Snellen, CMA     Sent: 2/23/2018  12:45 PM       To: Mat Trinidad MD    Returned Daisy's call informed her of Dr. Vázquez instructions, she verbalized understanding and would like to continue with OT & PT she also requested nursing to come out and review medication directions. Thanks   ----- Message -----     From: Mat Trinidad MD     Sent: 2/22/2018   6:15 PM       To: Patricia Danielle Snellen, CMA    I'm sorry, but I don't know who she means or how to get in touch with him. Did we send OT to the home through a Home Health agency? Sorry.    ----- Message -----     From: Patricia Danielle Snellen, CMA     Sent: 2/22/2018   1:59 PM       To: Mat Trinidad MD        ----- Message -----     From: Elsa Green     Sent: 2/20/2018   9:47 AM       To: [facility] [place]    Amos Villa called in regards to sis father, Hayder.  She wanted to ask Amos if he knew how to get in touch with an OT named aMnpreet?  He said he was sent personally by Dr. Trinidad.    Daisy said her mom can't remember everything that was told to her and she's trying to help out. If her dad can get out of the hospital, she wants to make sure she has everything ready.    Please call Daisy back.  [phone]

## 2018-03-02 NOTE — TELEPHONE ENCOUNTER
Returned Angela's call, she states pt is doing better, he is more alert, seem to be getting better each day. OT, PT and nursing has been to see patient and Dr. Cheng is stoping by to see pt tomorrow. I informed , he said we didn't need to schedule f/u, they can just keep in touch and let office know if he needs to be worked in for follow up.  She will keep in touch and call if needed to schedule follow up.

## 2018-03-02 NOTE — TELEPHONE ENCOUNTER
----- Message from Mat Trinidad MD sent at 3/2/2018  9:30 AM EST -----  Can you ask if there are specific problems that need to be addressed today? If not, then rescheduling an appointment for some later point is perfectly reasonable. Thanks.    ----- Message -----     From: Patricia Danielle Snellen, CMA     Sent: 3/2/2018   9:22 AM       To: Mat Trinidad MD    Per Guerline, pts wife called and is unable to keep appointment today, and wants to know if there is anything you can do over the phone. Thanks

## 2018-03-23 NOTE — TELEPHONE ENCOUNTER
----- Message from Elsa Green sent at 3/23/2018  9:53 AM EDT -----  Contact: Angela Aguilar (Spouse)  Amos/Jasmina Miller called and wanted me to send a message to Amos and his nurse alerting them that Hayder has only been given a few more days to live and is now unresponsive at this point.  I apologized and asked if she would like a call back and she stated that she has never received call backs from this office and has gotten the hint that we do not care. She hung up before I could respond.  I thought I would send this to the clinical pool and Jasmina.     Here's Angela's phone number:  [phone]

## 2018-04-03 ENCOUNTER — TELEPHONE (OUTPATIENT)
Dept: CARDIOLOGY | Facility: CLINIC | Age: 83
End: 2018-04-03

## 2018-04-03 NOTE — TELEPHONE ENCOUNTER
Pt's loop recorder is at [facility].  Sending box for patient to return transmitter.    Pt's wife called me back to let me know Mr. Aguilar had passed away on 3/26/18.